# Patient Record
Sex: FEMALE | Race: WHITE | NOT HISPANIC OR LATINO | Employment: FULL TIME | URBAN - NONMETROPOLITAN AREA
[De-identification: names, ages, dates, MRNs, and addresses within clinical notes are randomized per-mention and may not be internally consistent; named-entity substitution may affect disease eponyms.]

---
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
## 2020-02-11 ENCOUNTER — APPOINTMENT (OUTPATIENT)
Dept: LAB | Facility: HOSPITAL | Age: 20
End: 2020-02-11

## 2020-02-11 ENCOUNTER — OFFICE VISIT (OUTPATIENT)
Dept: OBSTETRICS AND GYNECOLOGY | Facility: CLINIC | Age: 20
End: 2020-02-11

## 2020-02-11 VITALS
BODY MASS INDEX: 25.78 KG/M2 | HEIGHT: 64 IN | WEIGHT: 151 LBS | DIASTOLIC BLOOD PRESSURE: 68 MMHG | SYSTOLIC BLOOD PRESSURE: 104 MMHG

## 2020-02-11 DIAGNOSIS — R10.2 PELVIC PAIN: ICD-10-CM

## 2020-02-11 DIAGNOSIS — Z97.5 BREAKTHROUGH BLEEDING ON NEXPLANON: Primary | ICD-10-CM

## 2020-02-11 DIAGNOSIS — Z11.3 SCREEN FOR STD (SEXUALLY TRANSMITTED DISEASE): ICD-10-CM

## 2020-02-11 DIAGNOSIS — N92.1 BREAKTHROUGH BLEEDING ON NEXPLANON: Primary | ICD-10-CM

## 2020-02-11 DIAGNOSIS — N94.10 DYSPAREUNIA IN FEMALE: ICD-10-CM

## 2020-02-11 PROCEDURE — 87491 CHLMYD TRACH DNA AMP PROBE: CPT | Performed by: NURSE PRACTITIONER

## 2020-02-11 PROCEDURE — 99213 OFFICE O/P EST LOW 20 MIN: CPT | Performed by: NURSE PRACTITIONER

## 2020-02-11 PROCEDURE — 87661 TRICHOMONAS VAGINALIS AMPLIF: CPT | Performed by: NURSE PRACTITIONER

## 2020-02-11 PROCEDURE — 87591 N.GONORRHOEAE DNA AMP PROB: CPT | Performed by: NURSE PRACTITIONER

## 2020-02-11 RX ORDER — DESOGESTREL AND ETHINYL ESTRADIOL 21-5 (28)
1 KIT ORAL DAILY
Qty: 28 TABLET | Refills: 6 | Status: SHIPPED | OUTPATIENT
Start: 2020-02-11 | End: 2020-06-16

## 2020-02-11 NOTE — PROGRESS NOTES
Bilateral diagnostic digital mammograms:

 

Reason for examination: Nodular densities bilaterally on screening 

mammogram.

 

Comparison is made to mammographic exam dated 1/15/2020.

 

True lateral and coned compression views in CC and oblique projections 

were obtained bilaterally.

 

With these additional views, a small nodular parenchymal density persists 

at approximately the 11:30 B position of the right breast and at the 12:00

a position of the left breast. Further evaluation with ultrasound follow.

 

IMPRESSION:

 

Small nodules persist bilaterally. Ultrasound to follow.

 

BI-RADS Category 0: Incomplete. Needs additional imaging evaluation.

 

 

Bilateral breast ultrasound:

 

Ultrasound examination bilateral breast and axilla was performed.

 

In the left breast at the 12:00 position 3 cm from the nipple, there is a 

1.2 cm elongated fibrocystic lesion with no abnormal vascularity. No other

cystic or solid lesions are seen and no abnormal appearing lymph nodes are

seen in the axilla.

 

In the right breast, there is a small 6.5 mm hypoechoic fibrocystic lesion

located at the 11:30 position 5 cm from the nipple with the patient 

sitting (this corresponds to the 10:00 position 2 cm from the nipple with 

the patient supine). This would correspond with the area of mammographic 

concern. No suspicious-appearing nodules are seen. No abnormal appearing 

lymph nodes are seen in the axilla.

 

IMPRESSION:

 

Benign fibrocystic lesions bilaterally which correlate with the 

mammographic findings. Recommend 6 month follow-up with bilateral breast 

ultrasound.

 

BI-RADS Category 3:  Probably Benign.

 

"Our facility is accredited by the [employer] 

Mammography Program."

 

This patient's information has been entered into a reminder system for the

patient to be notified with the results of her examination and a target 

date for the next mammogram.

 

Electronically signed by: Elma Mireles MD (1/24/2020 1:11 PM) [place] [id number] Subjective   Tiffany Pruitt is a 20 y.o. breakthrough bleeding on nexplanon, pelvic pain    LMP: 01/27/2020  BC: Nexplanon    Pt presents with complaints of breakthrough bleeding with nexplanon.  At times bleeding is heavy and she will go through a tampon every 2 hours with passage of quarter sized clots. She has also been experiencing pelvic pain and pain with intercourse for the past two months. The pelvic pain is almost daily and is described as sharp and stabbing.  Patient worries she may have endometriosis like her mother and grandmother.      Pelvic Pain   The patient's primary symptoms include pelvic pain and vaginal bleeding. The patient's pertinent negatives include no genital itching, genital lesions, genital odor, genital rash, missed menses or vaginal discharge. This is a recurrent problem. The current episode started more than 1 month ago. The problem occurs daily. The problem has been waxing and waning. The pain is moderate. The problem affects both sides. She is not pregnant. Associated symptoms include painful intercourse. Pertinent negatives include no abdominal pain, anorexia, back pain, chills, constipation, diarrhea, discolored urine, dysuria, fever, flank pain, frequency, headaches, hematuria, joint pain, joint swelling, nausea, rash, sore throat, urgency or vomiting. The vaginal bleeding is typical of menses. She has been passing clots. She has not been passing tissue. The symptoms are aggravated by intercourse and activity. She has tried nothing for the symptoms. She is sexually active. No, her partner does not have an STD. Contraceptive use: nexplanon. Her menstrual history has been irregular.       The following portions of the patient's history were reviewed and updated as appropriate: allergies, current medications, past family history, past medical history, past social history, past surgical history and problem list.    Review of Systems   Constitutional: Negative for chills,  diaphoresis, fatigue, fever and unexpected weight change.   HENT: Negative for sore throat.    Respiratory: Negative for apnea, chest tightness and shortness of breath.    Cardiovascular: Negative for chest pain, palpitations and leg swelling.   Gastrointestinal: Negative for abdominal distention, abdominal pain, anorexia, constipation, diarrhea, nausea and vomiting.   Genitourinary: Positive for dyspareunia, pelvic pain and vaginal bleeding. Negative for decreased urine volume, difficulty urinating, dysuria, enuresis, flank pain, frequency, genital sores, hematuria, menstrual problem, missed menses, urgency, vaginal discharge and vaginal pain.   Musculoskeletal: Negative for back pain and joint pain.   Skin: Negative for rash.   Neurological: Negative for headaches.         Objective   Physical Exam   Constitutional: She is oriented to person, place, and time. Vital signs are normal. She appears well-developed and well-nourished. No distress.   Cardiovascular: Normal rate, regular rhythm and normal heart sounds.   Pulmonary/Chest: Effort normal and breath sounds normal.   Abdominal: Soft. Normal appearance and bowel sounds are normal. There is tenderness in the right lower quadrant and left lower quadrant.   Neurological: She is alert and oriented to person, place, and time.   Skin: Skin is warm and dry. She is not diaphoretic.   Psychiatric: She has a normal mood and affect. Her behavior is normal.   Nursing note and vitals reviewed.        Assessment/Plan   Tiffany was seen today for vaginal bleeding.    Diagnoses and all orders for this visit:    Breakthrough bleeding on Nexplanon  -     desogestrel-ethinyl estradiol (KARIVA) 0.15-0.02/0.01 MG (21/5) per tablet; Take 1 tablet by mouth Daily.    Pelvic pain  -     US Non-ob Transvaginal; Future    Dyspareunia in female  -     US Non-ob Transvaginal; Future    Screen for STD (sexually transmitted disease)  -     Chlamydia trachomatis, Neisseria gonorrhoeae,  Trichomonas vaginalis, PCR - Urine, Urine, Clean Catch      Will try burst of hormones with Kariva.  RBA Kariva and reviewed potential side effects.  If Kariva does not stop BTB pt wants to have nexplanon discontinued.  TVUS to r/o anatomical abnormalities.  Labs to r/o STD.  Will follow up with results and plan of care.  RTC if symptoms worsen or fail to improve.

## 2020-03-29 PROCEDURE — U0001 2019-NCOV DIAGNOSTIC P: HCPCS

## 2020-06-16 PROCEDURE — U0002 COVID-19 LAB TEST NON-CDC: HCPCS | Performed by: NURSE PRACTITIONER

## 2020-07-24 ENCOUNTER — APPOINTMENT (OUTPATIENT)
Dept: GENERAL RADIOLOGY | Facility: HOSPITAL | Age: 20
End: 2020-07-24

## 2020-07-24 ENCOUNTER — HOSPITAL ENCOUNTER (EMERGENCY)
Facility: HOSPITAL | Age: 20
Discharge: HOME | End: 2020-07-25
Attending: FAMILY MEDICINE | Admitting: FAMILY MEDICINE

## 2020-07-24 DIAGNOSIS — S93.324A LISFRANC'S DISLOCATION, RIGHT, INITIAL ENCOUNTER: Primary | ICD-10-CM

## 2020-07-24 LAB
HCG SERPL QL: NEGATIVE
HOLD SPECIMEN: NORMAL
WHOLE BLOOD HOLD SPECIMEN: NORMAL
WHOLE BLOOD HOLD SPECIMEN: NORMAL

## 2020-07-24 PROCEDURE — 25010000002 MORPHINE PER 10 MG: Performed by: FAMILY MEDICINE

## 2020-07-24 PROCEDURE — 73630 X-RAY EXAM OF FOOT: CPT

## 2020-07-24 PROCEDURE — 25010000002 ONDANSETRON PER 1 MG: Performed by: FAMILY MEDICINE

## 2020-07-24 PROCEDURE — 96375 TX/PRO/DX INJ NEW DRUG ADDON: CPT

## 2020-07-24 PROCEDURE — 73610 X-RAY EXAM OF ANKLE: CPT

## 2020-07-24 PROCEDURE — 96374 THER/PROPH/DIAG INJ IV PUSH: CPT

## 2020-07-24 PROCEDURE — 99284 EMERGENCY DEPT VISIT MOD MDM: CPT

## 2020-07-24 PROCEDURE — 84703 CHORIONIC GONADOTROPIN ASSAY: CPT

## 2020-07-24 RX ORDER — ONDANSETRON 2 MG/ML
4 INJECTION INTRAMUSCULAR; INTRAVENOUS ONCE
Status: COMPLETED | OUTPATIENT
Start: 2020-07-24 | End: 2020-07-24

## 2020-07-24 RX ORDER — BUPIVACAINE HYDROCHLORIDE 5 MG/ML
10 INJECTION, SOLUTION EPIDURAL; INTRACAUDAL ONCE
Status: COMPLETED | OUTPATIENT
Start: 2020-07-24 | End: 2020-07-25

## 2020-07-24 RX ORDER — SODIUM CHLORIDE 0.9 % (FLUSH) 0.9 %
10 SYRINGE (ML) INJECTION AS NEEDED
Status: DISCONTINUED | OUTPATIENT
Start: 2020-07-24 | End: 2020-07-25 | Stop reason: HOSPADM

## 2020-07-24 RX ORDER — LIDOCAINE HYDROCHLORIDE 10 MG/ML
10 INJECTION, SOLUTION EPIDURAL; INFILTRATION; INTRACAUDAL; PERINEURAL ONCE
Status: COMPLETED | OUTPATIENT
Start: 2020-07-24 | End: 2020-07-25

## 2020-07-24 RX ADMIN — MORPHINE SULFATE 4 MG: 4 INJECTION INTRAVENOUS at 23:11

## 2020-07-24 RX ADMIN — ONDANSETRON 4 MG: 2 INJECTION INTRAMUSCULAR; INTRAVENOUS at 23:08

## 2020-07-25 ENCOUNTER — APPOINTMENT (OUTPATIENT)
Dept: CT IMAGING | Facility: HOSPITAL | Age: 20
End: 2020-07-25

## 2020-07-25 ENCOUNTER — APPOINTMENT (OUTPATIENT)
Dept: GENERAL RADIOLOGY | Facility: HOSPITAL | Age: 20
End: 2020-07-25

## 2020-07-25 VITALS
TEMPERATURE: 98.7 F | DIASTOLIC BLOOD PRESSURE: 77 MMHG | HEIGHT: 64 IN | SYSTOLIC BLOOD PRESSURE: 121 MMHG | BODY MASS INDEX: 25.27 KG/M2 | RESPIRATION RATE: 18 BRPM | OXYGEN SATURATION: 97 % | WEIGHT: 148 LBS | HEART RATE: 74 BPM

## 2020-07-25 PROCEDURE — 63710000001 ONDANSETRON ODT 4 MG TABLET DISPERSIBLE: Performed by: FAMILY MEDICINE

## 2020-07-25 PROCEDURE — 99283 EMERGENCY DEPT VISIT LOW MDM: CPT | Performed by: ORTHOPAEDIC SURGERY

## 2020-07-25 PROCEDURE — 28600 TREAT FOOT DISLOCATION: CPT | Performed by: ORTHOPAEDIC SURGERY

## 2020-07-25 PROCEDURE — 73700 CT LOWER EXTREMITY W/O DYE: CPT

## 2020-07-25 PROCEDURE — 73620 X-RAY EXAM OF FOOT: CPT

## 2020-07-25 RX ORDER — SODIUM CHLORIDE 9 MG/ML
INJECTION, SOLUTION INTRAVENOUS
Status: DISCONTINUED
Start: 2020-07-25 | End: 2020-07-25 | Stop reason: HOSPADM

## 2020-07-25 RX ORDER — KETAMINE HYDROCHLORIDE 100 MG/ML
1 INJECTION INTRAMUSCULAR; INTRAVENOUS ONCE
Status: COMPLETED | OUTPATIENT
Start: 2020-07-25 | End: 2020-07-25

## 2020-07-25 RX ORDER — OXYCODONE HYDROCHLORIDE AND ACETAMINOPHEN 5; 325 MG/1; MG/1
1 TABLET ORAL ONCE
Status: COMPLETED | OUTPATIENT
Start: 2020-07-25 | End: 2020-07-25

## 2020-07-25 RX ORDER — ONDANSETRON 4 MG/1
4 TABLET, ORALLY DISINTEGRATING ORAL ONCE
Status: COMPLETED | OUTPATIENT
Start: 2020-07-25 | End: 2020-07-25

## 2020-07-25 RX ADMIN — OXYCODONE HYDROCHLORIDE AND ACETAMINOPHEN 1 TABLET: 5; 325 TABLET ORAL at 03:00

## 2020-07-25 RX ADMIN — ONDANSETRON 4 MG: 4 TABLET, ORALLY DISINTEGRATING ORAL at 03:00

## 2020-07-25 RX ADMIN — BUPIVACAINE HYDROCHLORIDE 10 ML: 5 INJECTION, SOLUTION EPIDURAL; INTRACAUDAL; PERINEURAL at 00:00

## 2020-07-25 RX ADMIN — LIDOCAINE HYDROCHLORIDE 10 ML: 10 INJECTION, SOLUTION EPIDURAL; INFILTRATION; INTRACAUDAL; PERINEURAL at 00:00

## 2020-07-25 RX ADMIN — KETAMINE HYDROCHLORIDE 67 MG: 100 INJECTION INTRAMUSCULAR; INTRAVENOUS at 00:52

## 2020-07-25 NOTE — ED TRIAGE NOTES
Approximately 20 mins prior to arrival: Pt states she stepped off porch step wrong causing shooting pain up right foot causing her to fall.

## 2020-07-25 NOTE — ED PROVIDER NOTES
Subjective     History provided by:  Patient   used: No    Patient is a 20 years old female with no signal past medical history who presented here today because of foot injury.  She said that about 20 minutes ago she stepped off porch step wrong way and that caused shooting pain up her right foot causing her to fall.  Also, having some swelling and pain.  Denies any numbness or tingling.    Review of Systems   Musculoskeletal: Positive for joint swelling.   All other systems reviewed and are negative.      Past Medical History:   Diagnosis Date   • Allergic rhinitis    • Asthma    • Bronchitis    • Candidiasis of urogenital site    • Encounter for screening for infections with a predominantly sexual mode of transmission    • Fever    • Hemangioma     Hemangioma   • Menorrhagia    • Miscarriage    • Oral contraceptive prescribed    • Other sex counseling    • Otitis media    • Upper respiratory infection    • Urticaria        Allergies   Allergen Reactions   • Amoxicillin Hives   • Ceftin [Cefuroxime] Hives   • Ciprocinonide [Fluocinolone] Hives   • Penicillins Hives   • Sulfa Antibiotics Other (See Comments)     Pt does not know the reaction to this med       Past Surgical History:   Procedure Laterality Date   • INJECTION OF MEDICATION  07/21/2015    Depo Provera (medroxyprogesterone acetate) (2)     • SKIN BIOPSY  2000    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 [facility]       Family History   Problem Relation Age of Onset   • Asthma Other    • Bipolar disorder Other    • Endometriosis Other    • Schizophrenia Other        Social History     Socioeconomic History   • Marital status: Single     Spouse name: Not on file   • Number of children: Not on file   • Years of education: Not on file   • Highest education level: Not on file   Tobacco Use   • Smoking  "status: Never Smoker   • Smokeless tobacco: Never Used   Substance and Sexual Activity   • Alcohol use: Yes   • Drug use: No   • Sexual activity: Yes     Partners: Male     Birth control/protection: Injection       /77   Pulse 74   Temp 98.7 °F (37.1 °C) (Temporal)   Resp 18   Ht 162.6 cm (64\")   Wt 67.1 kg (148 lb)   LMP 07/15/2020   SpO2 97%   BMI 25.40 kg/m²     Objective   Physical Exam   Constitutional: She is oriented to person, place, and time. She appears well-developed and well-nourished.   HENT:   Head: Normocephalic and atraumatic.   Right Ear: External ear normal.   Left Ear: External ear normal.   Nose: Nose normal.   Mouth/Throat: Oropharynx is clear and moist.   Neck: Normal range of motion. Neck supple.   Cardiovascular: Normal rate, regular rhythm, normal heart sounds and intact distal pulses.   Pulmonary/Chest: Effort normal and breath sounds normal.   Abdominal: Soft. Bowel sounds are normal.   Musculoskeletal: She exhibits edema, tenderness and deformity.   Neurological: She is alert and oriented to person, place, and time.   Skin: Skin is warm. Capillary refill takes less than 2 seconds.   Nursing note and vitals reviewed.      Procedural Sedation  Date/Time: 7/25/2020 1:12 AM  Performed by: Gibson Martinez MD  Authorized by: Gibson Martinez MD     Consent:     Consent obtained:  Written    Consent given by:  Patient    Risks discussed:  Allergic reaction, dysrhythmia, inadequate sedation, nausea and vomiting  Indications:     Procedure performed:  Dislocation reduction    Procedure necessitating sedation performed by:  Different physician    Intended level of sedation:  Moderate (conscious sedation)  Pre-sedation assessment:     NPO status caution: urgency dictates proceeding with non-ideal NPO status      ASA classification: class 1 - normal, healthy patient      Neck mobility: normal      Mouth opening:  3 or more finger widths    Mallampati score:  I - soft " palate, uvula, fauces, pillars visible    Pre-sedation assessments completed and reviewed: airway patency      History of difficult intubation: no      Pre-sedation assessment completed:  7/25/2020 12:40 AM  Immediate pre-procedure details:     Reassessment: Patient reassessed immediately prior to procedure      Reviewed: vital signs and relevant labs/tests      Verified: bag valve mask available, emergency equipment available, intubation equipment available, IV patency confirmed, oxygen available and reversal medications available    Procedure details (see MAR for exact dosages):     Sedation start time:  7/25/2020 12:54 AM    Preoxygenation:  Nasal cannula    Sedation:  Ketamine    Intra-procedure monitoring:  Blood pressure monitoring and cardiac monitor    Intra-procedure events: none      Sedation end time:  7/25/2020 1:10 AM  Post-procedure details:     Post-sedation assessment completed:  7/25/2020 1:17 AM    Attendance: Constant attendance by certified staff until patient recovered      Recovery: Patient returned to pre-procedure baseline      Estimated blood loss (see I/O flowsheets): no      Complications:  None    Post-sedation assessments completed and reviewed: airway patency      Specimens recovered:  None    Patient is stable for discharge or admission: yes      Patient tolerance:  Tolerated well, no immediate complications               ED Course  ED Course as of Jul 25 0228   Sat Jul 25, 2020   0118 Dr. Monique came and reduced the fracture and dislocation.  He want patient to follow-up with him in his office on Wednesday.  Elevation and rest encouraged.  Take the medication as directed.  Come back to the ER symptoms get worse.    [MO]      ED Course User Index  [MO] Gibson Martinez MD            Labs Reviewed   HCG, SERUM, QUALITATIVE - Normal   RAINBOW DRAW    Narrative:     The following orders were created for panel order [place] Draw.  Procedure                               Abnormality          Status                     ---------                               -----------         ------                     Light Blue Top[[id number]]                                   Final result               Green Top (Gel)[[id number]]                                  Final result               Lavender Top[[id number]]                                     Final result               Gold Top - SST[[id number]]                                                                Please view results for these tests on the individual orders.   LIGHT BLUE TOP   GREEN TOP   LAVENDER TOP   GOLD TOP - SST       CT Lower Extremity Right Without Contrast   Final Result   Lisfranc fracture dislocation as above.      Electronically signed by:  Earnest Jernigan MD  7/25/2020 2:03 AM CDT   Workstation: [id number]      XR Foot 2 View Right   Final Result      Interval partial closed reduction of the previously seen acute   Lisfranc fracture-dislocation.      Electronically signed by:  Emiliana Freeman MD  7/25/2020 1:17 AM CDT   Workstation: [id number]      XR Ankle 3+ View Right   Final Result   1. No acute osseous finding..            FINDINGS RIGHT FOOT: 3 views of the right foot were obtained.   There is a Lisfranc type fracture dislocation along the   tarsal-metatarsal joint. There are small avulsed fragments along   the lateral aspect of the cuboid which are minimally displaced.   On the lateral view, there are some small fracture fragments   dorsal to the cuneiforms favored to originate from the base of   the second metatarsal. The second through fourth metatarsal bases   are subluxed dorsally and the third through fifth metatarsal   bases are dislocated laterally. There is generalized soft tissue   swelling. CT may be helpful to better assess the articular   relationships as well as evaluate for additional, occult   fractures which are suspected.            IMPRESSION: Lisfranc type fracture dislocation about the   tarsal-metatarsal joint as  discussed      Electronically signed by:  Alejandro Freed MD  7/24/2020 10:59 PM   CDT Workstation: [id number]      XR Foot 3+ View Right   Final Result   1. No acute osseous finding..            FINDINGS RIGHT FOOT: 3 views of the right foot were obtained.   There is a Lisfranc type fracture dislocation along the   tarsal-metatarsal joint. There are small avulsed fragments along   the lateral aspect of the cuboid which are minimally displaced.   On the lateral view, there are some small fracture fragments   dorsal to the cuneiforms favored to originate from the base of   the second metatarsal. The second through fourth metatarsal bases   are subluxed dorsally and the third through fifth metatarsal   bases are dislocated laterally. There is generalized soft tissue   swelling. CT may be helpful to better assess the articular   relationships as well as evaluate for additional, occult   fractures which are suspected.            IMPRESSION: Lisfranc type fracture dislocation about the   tarsal-metatarsal joint as discussed      Electronically signed by:  Alejandro Freed MD  7/24/2020 10:59 PM   CDT Workstation: [id number]                                        [facility]    Final diagnoses:   Lisfranc's dislocation, right, initial encounter            Gibson Martinez MD  07/25/20 0228

## 2020-07-25 NOTE — CONSULTS
Tiffany Pruitt is a 20 y.o. female   Primary provider:  Provider, No Known       Chief Complaint   Patient presents with   • Foot Injury       HISTORY OF PRESENT ILLNESS: Right foot pain.    History of Present Illness Ms. Pruitt is 20 years old.  She was descending some stairs when she slipped and had a twisting injury to her right foot.  This was an isolated injury.  She presented to the emergency room where x-rays showed a Lisfranc injury to the right foot.  Dr. De Souza has asked that I see her for evaluation and treatment.    She is taken no medications on a regular basis and is allergic to penicillin and sulfa drugs amoxicillin and Cipro..  She does not smoke.  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  She is single and lives in [place].  She says her general health is good.     CONCURRENT MEDICAL HISTORY:    Past Medical History:   Diagnosis Date   • Allergic rhinitis    • Asthma    • Bronchitis    • Candidiasis of urogenital site    • Encounter for screening for infections with a predominantly sexual mode of transmission    • Fever    • Hemangioma     Hemangioma   • Menorrhagia    • Miscarriage    • Oral contraceptive prescribed    • Other sex counseling    • Otitis media    • Upper respiratory infection    • Urticaria        Allergies   Allergen Reactions   • Amoxicillin Hives   • Ceftin [Cefuroxime] Hives   • Ciprocinonide [Fluocinolone] Hives   • Penicillins Hives   • Sulfa Antibiotics Other (See Comments)     Pt does not know the reaction to this med         Current Facility-Administered Medications:   •  sodium chloride 0.9 % infusion  - ADS Override Pull, , , ,   •  sodium chloride 0.9 % flush 10 mL, 10 mL, Intravenous, PRN, Ozor, Gibson Cleaning MD    Current Outpatient Medications:   •  azithromycin (ZITHROMAX) 250 MG tablet, Take 2 tablets the first day, then 1 tablet daily for 4 days., Disp: 6 tablet, Rfl: 0  •  Etonogestrel (NEXPLANON) 68 MG implant subdermal implant,  "Inject 1 each into the appropriate area of the skin as directed by provider 1 (One) Time., Disp: , Rfl:     Past Surgical History:   Procedure Laterality Date   • INJECTION OF MEDICATION  07/21/2015    Depo Provera (medroxyprogesterone acetate) (2)     • SKIN BIOPSY  2000    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 [facility]       Family History   Problem Relation Age of Onset   • Asthma Other    • Bipolar disorder Other    • Endometriosis Other    • Schizophrenia Other        Social History     Socioeconomic History   • Marital status: Single     Spouse name: Not on file   • Number of children: Not on file   • Years of education: Not on file   • Highest education level: Not on file   Tobacco Use   • Smoking status: Never Smoker   • Smokeless tobacco: Never Used   Substance and Sexual Activity   • Alcohol use: Yes   • Drug use: No   • Sexual activity: Yes     Partners: Male     Birth control/protection: Injection        Review of Systems  Is remarkable for foot pain as noted above.  There is been no numbness or tingling in the foot.  PHYSICAL EXAMINATION:       Vitals:    07/24/20 2144 07/24/20 2314 07/25/20 0051 07/25/20 0100   BP: (!) 152/102 158/94 147/87 (!) 160/105   BP Location: Left arm Right arm Right arm    Patient Position: Sitting Lying Lying    Pulse: 116 88 99 (!) 128   Resp: 20 18 18    Temp: 98.7 °F (37.1 °C)      TempSrc: Temporal      SpO2: 98% 98% 100% 100%   Weight: 67.1 kg (148 lb)      Height: 162.6 cm (64\")          Physical Exam she is alert and in remarkably little pain.  She responds appropriately to questions and commands.    GAIT:     []  Normal  []  Antalgic    Assistive device: []  None  []  Walker     []  Crutches  []  Cane     []  Wheelchair  []  Stretcher    Ortho Exam is directed to the right lower extremity.  There is mild to moderate swelling of " the dorsum of the foot.  Skin is unbroken.  There is early ecchymosis over the dorsum of the midfoot.  There is no blistering of the skin.  Posterior tibial pulses strong.  Sensory exam is intact to soft touch.  There is a tattoo over the posterior medial aspect of the ankle.    Radiographs of the foot show what appears to be a divergent Lisfranc injury involving all rays of the foot.  The lateral metatarsals are displaced dorsally.  The first ray is also displaced somewhat laterally.    Treatment options were reviewed.  I recommended close manipulation.  She was agreeable with this.    An ankle block was performed using a 50-50 mixture of Marcaine and 1% Xylocaine.  Patient was sedated with ketamine as supervised by .      The tarsometatarsal joints were then manipulated with traction and dorsally applied pressure over the lateral metatarsals.  A clinical reduction was achieved.      Radiographs of the foot were repeated and these confirmed reduction of the dislocations.      A bulky gauze dressing was placed on the foot followed by a short leg posterior fiberglass splint.  She tolerated the procedure well.          Xr Ankle 3+ View Right    Result Date: 7/24/2020  Narrative: THREE-VIEW RIGHT ANKLE, THREE-VIEW RIGHT FOOT CLINICAL HISTORY: fall, pain FINDINGS RIGHT ANKLE: 3 views of the right ankle were obtained. No acute fracture or dislocation identified. Soft tissues appear unremarkable..     Impression: 1. No acute osseous finding.. FINDINGS RIGHT FOOT: 3 views of the right foot were obtained. There is a Lisfranc type fracture dislocation along the tarsal-metatarsal joint. There are small avulsed fragments along the lateral aspect of the cuboid which are minimally displaced. On the lateral view, there are some small fracture fragments dorsal to the cuneiforms favored to originate from the base of the second metatarsal. The second through fourth metatarsal bases are subluxed dorsally and the third through  fifth metatarsal bases are dislocated laterally. There is generalized soft tissue swelling. CT may be helpful to better assess the articular relationships as well as evaluate for additional, occult fractures which are suspected. IMPRESSION: Lisfranc type fracture dislocation about the tarsal-metatarsal joint as discussed Electronically signed by:  Alejandro Freed MD  7/24/2020 10:59 PM CDT Workstation: [id number]    Xr Foot 3+ View Right    Result Date: 7/24/2020  Narrative: THREE-VIEW RIGHT ANKLE, THREE-VIEW RIGHT FOOT CLINICAL HISTORY: fall, pain FINDINGS RIGHT ANKLE: 3 views of the right ankle were obtained. No acute fracture or dislocation identified. Soft tissues appear unremarkable..     Impression: 1. No acute osseous finding.. FINDINGS RIGHT FOOT: 3 views of the right foot were obtained. There is a Lisfranc type fracture dislocation along the tarsal-metatarsal joint. There are small avulsed fragments along the lateral aspect of the cuboid which are minimally displaced. On the lateral view, there are some small fracture fragments dorsal to the cuneiforms favored to originate from the base of the second metatarsal. The second through fourth metatarsal bases are subluxed dorsally and the third through fifth metatarsal bases are dislocated laterally. There is generalized soft tissue swelling. CT may be helpful to better assess the articular relationships as well as evaluate for additional, occult fractures which are suspected. IMPRESSION: Lisfranc type fracture dislocation about the tarsal-metatarsal joint as discussed Electronically signed by:  Alejandro Freed MD  7/24/2020 10:59 PM CDT Workstation: [id number]          ASSESSMENT: Lisfranc dislocation right foot.  The severity of the injury was stressed to her.  I think she will require internal fixation of her Lisfranc joint.  However this should be done as a delayed procedure.  CT scan will be performed of the foot prior to discharge today.  She was given a  prescription for 30 hydrocodone 7.5 mg each to take as needed for pain.  She was instructed in strict elevation.  She will call for pain not controlled with the above medication.  She was also given crutches.  Return to my office in 5 to 6 days for further counseling.          PLAN      Jon Mckeon MD

## 2020-07-28 ENCOUNTER — PREP FOR SURGERY (OUTPATIENT)
Dept: OTHER | Facility: HOSPITAL | Age: 20
End: 2020-07-28

## 2020-07-28 DIAGNOSIS — S93.324A LISFRANC'S DISLOCATION, RIGHT, INITIAL ENCOUNTER: Primary | ICD-10-CM

## 2020-07-28 PROBLEM — S93.324D LISFRANC DISLOCATION, RIGHT, SUBSEQUENT ENCOUNTER: Status: ACTIVE | Noted: 2020-07-28

## 2020-07-28 RX ORDER — CLINDAMYCIN PHOSPHATE 600 MG/50ML
600 INJECTION INTRAVENOUS ONCE
Status: CANCELLED | OUTPATIENT
Start: 2020-08-03 | End: 2020-07-28

## 2020-07-29 ENCOUNTER — OFFICE VISIT (OUTPATIENT)
Dept: ORTHOPEDIC SURGERY | Facility: CLINIC | Age: 20
End: 2020-07-29

## 2020-07-29 VITALS
DIASTOLIC BLOOD PRESSURE: 89 MMHG | OXYGEN SATURATION: 98 % | HEART RATE: 82 BPM | TEMPERATURE: 98.5 F | SYSTOLIC BLOOD PRESSURE: 111 MMHG | HEIGHT: 64 IN | WEIGHT: 148 LBS | BODY MASS INDEX: 25.27 KG/M2

## 2020-07-29 DIAGNOSIS — M79.671 RIGHT FOOT PAIN: Primary | ICD-10-CM

## 2020-07-29 DIAGNOSIS — S93.324D CLOSED DISLOCATION OF TARSOMETATARSAL JOINT OF RIGHT FOOT, SUBSEQUENT ENCOUNTER: ICD-10-CM

## 2020-07-29 PROBLEM — S93.324A LISFRANC'S DISLOCATION, RIGHT, INITIAL ENCOUNTER: Status: ACTIVE | Noted: 2020-07-29

## 2020-07-29 PROCEDURE — 99024 POSTOP FOLLOW-UP VISIT: CPT | Performed by: ORTHOPAEDIC SURGERY

## 2020-07-29 PROCEDURE — 29515 APPLICATION SHORT LEG SPLINT: CPT | Performed by: ORTHOPAEDIC SURGERY

## 2020-07-29 RX ORDER — ONDANSETRON 4 MG/1
4 TABLET, FILM COATED ORAL EVERY 8 HOURS PRN
COMMUNITY
End: 2021-01-26

## 2020-07-29 RX ORDER — HYDROCODONE BITARTRATE AND ACETAMINOPHEN 7.5; 325 MG/1; MG/1
1 TABLET ORAL EVERY 6 HOURS PRN
Status: ON HOLD | COMMUNITY
End: 2020-08-03

## 2020-07-29 NOTE — PROGRESS NOTES
Tiffany Pruitt is a 20 y.o. female   Primary provider:  Provider, No Known       Chief Complaint   Patient presents with   • Right Foot - Initial Evaluation, Pain       HISTORY OF PRESENT ILLNESS: Patient is being seen for her right foot. Pain level of 0/10.    This is the first office follow-up for a Lisfranc injury to the right foot.    Ms. Pruitt is 20 years old.  She sustained a Lisfranc fracture dislocation of the right foot 24 July.  She underwent manipulation and splinting in the emergency room.  Her pain is been fairly well controlled.  She denies any numbness or tingling.    Home medications include hydrocodone and she is allergic to penicillin amoxicillin Ceftin Cipro and sulfa drugs.  She does not smoke.     Past medical history shows her general health is been good.  Previous surgeries include removal of wisdom teeth and removal of what sounds like an angioma from the forehead as an infant.  There is been no history of anesthetic complication.  She denies any other ongoing medical problems.    Family history she is single.    History she lives in [place] with her boyfriend.  She is employed as a nursing assistant I believe.    Review of systems remarkable for right foot pain.    Pain   This is a new problem. Episode onset: 7/24/2020. The problem occurs constantly. Associated symptoms comments: Aching . The symptoms are aggravated by standing. She has tried acetaminophen and rest for the symptoms.        CONCURRENT MEDICAL HISTORY:    Past Medical History:   Diagnosis Date   • Allergic rhinitis    • Asthma    • Bronchitis    • Candidiasis of urogenital site    • Encounter for screening for infections with a predominantly sexual mode of transmission    • Fever    • Hemangioma     Hemangioma   • Menorrhagia    • Miscarriage    • Oral contraceptive prescribed    • Other sex counseling    • Otitis media    • Upper respiratory infection    • Urticaria        Allergies   Allergen Reactions   •  Amoxicillin Hives   • Ceftin [Cefuroxime] Hives   • Ciprocinonide [Fluocinolone] Hives   • Penicillins Hives   • Sulfa Antibiotics Other (See Comments)     Pt does not know the reaction to this med         Current Outpatient Medications:   •  azithromycin (ZITHROMAX) 250 MG tablet, Take 2 tablets the first day, then 1 tablet daily for 4 days., Disp: 6 tablet, Rfl: 0  •  HYDROcodone-acetaminophen (NORCO) 7.5-325 MG per tablet, Take 1 tablet by mouth Every 6 (Six) Hours As Needed for Moderate Pain ., Disp: , Rfl:   •  ondansetron (ZOFRAN) 4 MG tablet, Take 4 mg by mouth Every 8 (Eight) Hours As Needed for Nausea or Vomiting., Disp: , Rfl:     Past Surgical History:   Procedure Laterality Date   • INJECTION OF MEDICATION  07/21/2015    Depo Provera (medroxyprogesterone acetate) (2)     • SKIN BIOPSY  2000    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 [facility]       Family History   Problem Relation Age of Onset   • Asthma Other    • Bipolar disorder Other    • Endometriosis Other    • Schizophrenia Other    • Cancer Other    • Lung disease Other    • Diabetes Other        Social History     Socioeconomic History   • Marital status: Single     Spouse name: Not on file   • Number of children: Not on file   • Years of education: Not on file   • Highest education level: Not on file   Tobacco Use   • Smoking status: Never Smoker   • Smokeless tobacco: Never Used   Substance and Sexual Activity   • Alcohol use: Yes   • Drug use: No   • Sexual activity: Yes     Partners: Male     Birth control/protection: Injection        Review of Systems   Constitutional: Negative.    HENT: Negative.    Eyes: Positive for visual disturbance.   Respiratory: Negative.    Cardiovascular: Negative.    Gastrointestinal: Negative.    Endocrine: Negative.    Genitourinary: Negative.    Musculoskeletal: Negative.   "  Skin: Negative.    Allergic/Immunologic: Negative.    Neurological: Positive for dizziness.   Hematological: Negative.    Psychiatric/Behavioral: Negative.      Review of systems is positive as noted above.  PHYSICAL EXAMINATION:       Vitals:    07/29/20 1515   BP: 111/89   BP Location: Right arm   Patient Position: Sitting   Cuff Size: Adult   Pulse: 82   Temp: 98.5 °F (36.9 °C)   TempSrc: Temporal   SpO2: 98%   Weight: 67.1 kg (148 lb)   Height: 162.6 cm (64\")   PainSc: 0-No pain       Physical Exam in general she is healthy-appearing alert and in no apparent distress.  She responds appropriately to questions and commands.    HEENT exam showed extraocular movements are intact.  Oropharynx shows teeth are in good repair.    Lungs are clear to auscultation.    Cardiac exam shows a regular rhythm normal rate no murmur.    The abdomen is soft and nontender with active bowel sounds.  Genitourinary and rectal exams were not done.    Exam of the extremities is directed to the right lower extremity.  There is a short leg posterior splint in place.  The splint was removed.  There was moderate swelling and ecchymosis in the dorsum of the foot.  Skin was otherwise unremarkable.  Sensory exam was intact to soft touch.  The toes are warm.  Passive flexion and extension of the toes produces the expected amount of pain she was felt to be appropriate for her injury.    GAIT:     []  Normal  []  Antalgic    Assistive device: []  None  []  Walker     []  Crutches  []  Cane     []  Wheelchair  []  Stretcher    Ortho Exam          Xr Ankle 3+ View Right    Result Date: 7/24/2020  Narrative: THREE-VIEW RIGHT ANKLE, THREE-VIEW RIGHT FOOT CLINICAL HISTORY: fall, pain FINDINGS RIGHT ANKLE: 3 views of the right ankle were obtained. No acute fracture or dislocation identified. Soft tissues appear unremarkable..     Impression: 1. No acute osseous finding.. FINDINGS RIGHT FOOT: 3 views of the right foot were obtained. There is a Lisfranc " type fracture dislocation along the tarsal-metatarsal joint. There are small avulsed fragments along the lateral aspect of the cuboid which are minimally displaced. On the lateral view, there are some small fracture fragments dorsal to the cuneiforms favored to originate from the base of the second metatarsal. The second through fourth metatarsal bases are subluxed dorsally and the third through fifth metatarsal bases are dislocated laterally. There is generalized soft tissue swelling. CT may be helpful to better assess the articular relationships as well as evaluate for additional, occult fractures which are suspected. IMPRESSION: Lisfranc type fracture dislocation about the tarsal-metatarsal joint as discussed Electronically signed by:  Alejandro Freed MD  7/24/2020 10:59 PM CDT Workstation: [id number]    Xr Foot 2 View Right    Result Date: 7/25/2020  Narrative: EXAM DESCRIPTION: XR FOOT 2 VW RIGHT CLINICAL HISTORY: 20 years Female S/P reduction TECHNIQUE: Two views of the right foot. COMPARISON: 7/24/2020. FINDINGS: There has been partial closed reduction of the previously seen acute Lisfranc fracture-dislocation. There remains mild widening of the Lisfranc joint and slight lateral subluxation of the second through fifth metatarsals relative to the tarsal bones. Reduction interval reduction of the previously seen dorsal displacement. There is an acute osseous avulsion from the medial base of the second metatarsal. There are suspected small fracture fragments likely arising from the cuboid, lateral cuneiform, and middle and/or medial cuneiforms. No aggressive osseous lesion. No no radiodense foreign body.     Impression: Interval partial closed reduction of the previously seen acute Lisfranc fracture-dislocation. Electronically signed by:  Emiliana Freeman MD  7/25/2020 1:17 AM CDT Workstation: [id number]    Xr Foot 3+ View Right    Result Date: 7/24/2020  Narrative: THREE-VIEW RIGHT ANKLE, THREE-VIEW RIGHT FOOT CLINICAL  HISTORY: fall, pain FINDINGS RIGHT ANKLE: 3 views of the right ankle were obtained. No acute fracture or dislocation identified. Soft tissues appear unremarkable..     Impression: 1. No acute osseous finding.. FINDINGS RIGHT FOOT: 3 views of the right foot were obtained. There is a Lisfranc type fracture dislocation along the tarsal-metatarsal joint. There are small avulsed fragments along the lateral aspect of the cuboid which are minimally displaced. On the lateral view, there are some small fracture fragments dorsal to the cuneiforms favored to originate from the base of the second metatarsal. The second through fourth metatarsal bases are subluxed dorsally and the third through fifth metatarsal bases are dislocated laterally. There is generalized soft tissue swelling. CT may be helpful to better assess the articular relationships as well as evaluate for additional, occult fractures which are suspected. IMPRESSION: Lisfranc type fracture dislocation about the tarsal-metatarsal joint as discussed Electronically signed by:  Alejandro Freed MD  7/24/2020 10:59 PM CDT Workstation: [id number]    Ct Lower Extremity Right Without Contrast    Result Date: 7/25/2020  Narrative: HISTORY:  foot fracture, S93.324A Dislocation of tarsometatarsal joint of right foot, initial encounter PROCEDURE: CT LOWER EXTREMITY WITHOUT IV CONTRAST TECHNIQUE: Noncontrast axial, coronal, and sagittal images of the right foot were obtained.  This exam was performed according to our departmental dose-optimization program, which includes automated exposure control, adjustment of the mA and/or kV according to patient size and/or use of iterative reconstruction technique. COMPARISON:  Plain films from earlier today INTERPRETATION: Examination is trace mildly displaced fractures of the second and third metatarsal bases, intermediate and lateral cuneiforms, and cuboid.  Mild lateral subluxation of the second through fifth metatarsals is also noted.   There is normal alignment of the first TMT joint.  There is generalized soft tissue swelling.  Posterior splint is in place.     Impression: Lisfranc fracture dislocation as above. Electronically signed by:  Earnest Jernigan MD  7/25/2020 2:03 AM CDT Workstation: [id number]          ASSESSMENT: Lisfranc fracture dislocation right foot.    We had a prolonged discussion concerning the natural history of the disorder and treatment options.  She understands it is likely she will have chronic problems with the foot from now on.  I think her best chance for good result would be with accurate reduction and stabilization of the tarsometatarsal joints.  Technique of surgery was discussed and x-rays were reviewed with her.  Anticipated benefits of surgery as well as potential complications of surgery and anesthetic were reviewed in detail and she appeared to understand all matters discussed and wished to proceed.    The foot was dressed with an Ace wrap.  A new short leg posterior splint was then fabricated of fiberglass casting material.  She may remove the splint for bathing and frequent rewrapping of the foot.  She was instructed in strict elevation to help mobilize her edema.    Surgery has tentatively been scheduled for 3 August.    Diagnoses and all orders for this visit:    Right foot pain    Closed dislocation of tarsometatarsal joint of right foot, subsequent encounter    Other orders  -     HYDROcodone-acetaminophen (NORCO) 7.5-325 MG per tablet; Take 1 tablet by mouth Every 6 (Six) Hours As Needed for Moderate Pain .  -     ondansetron (ZOFRAN) 4 MG tablet; Take 4 mg by mouth Every 8 (Eight) Hours As Needed for Nausea or Vomiting.          PLAN  Body mass index is 25.4 kg/m².    No follow-ups on file.    Jon Mckeon MD

## 2020-07-30 ENCOUNTER — PREP FOR SURGERY (OUTPATIENT)
Dept: OTHER | Facility: HOSPITAL | Age: 20
End: 2020-07-30

## 2020-07-30 NOTE — H&P
Tiffany Pruitt is a 20 y.o. female   Primary provider:  Provider, No Known       No chief complaint on file.      HISTORY OF PRESENT ILLNESS:         Ms. Pruitt is 20 years old.  She sustained a Lisfranc fracture dislocation of the right foot 24 July.  She underwent manipulation and splinting in the emergency room.  Her pain is been fairly well controlled.  She denies any numbness or tingling.  Imaging including CT scan done after manipulation showed continued displacement of the tarsometatarsal joints.  Treatment options reviewed and I recommended open reduction and internal fixation of the Lisfranc joint.    Home medications include hydrocodone . she is allergic to penicillin amoxicillin Ceftin Cipro and sulfa drugs.  She does not smoke.     Past medical history shows her general health is been good.  Previous surgeries include removal of wisdom teeth and removal of what sounds like an angioma from the forehead as an infant.  There is been no history of anesthetic complication.  She denies any other ongoing medical problems.    Family history she is single.    History she lives in [place] with her boyfriend.  She is employed as a nursing assistant I believe.    Review of systems remarkable for right foot pain.    Pain   This is a new problem. Episode onset: 7/24/2020. The problem occurs constantly. Associated symptoms comments: Aching . The symptoms are aggravated by standing. She has tried acetaminophen and rest for the symptoms.        CONCURRENT MEDICAL HISTORY:    Past Medical History:   Diagnosis Date   • Allergic rhinitis    • Asthma    • Bronchitis    • Candidiasis of urogenital site    • Encounter for screening for infections with a predominantly sexual mode of transmission    • Fever    • Hemangioma     Hemangioma   • Menorrhagia    • Miscarriage    • Oral contraceptive prescribed    • Other sex counseling    • Otitis media    • Upper respiratory infection    • Urticaria        Allergies    Allergen Reactions   • Amoxicillin Hives   • Ceftin [Cefuroxime] Hives   • Ciprocinonide [Fluocinolone] Hives   • Penicillins Hives   • Sulfa Antibiotics Other (See Comments)     Pt does not know the reaction to this med         Current Outpatient Medications:   •  azithromycin (ZITHROMAX) 250 MG tablet, Take 2 tablets the first day, then 1 tablet daily for 4 days. (Patient taking differently: Take 250 mg by mouth Daily. 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 [facility]       Family History   Problem Relation Age of Onset   • Asthma Other    • Bipolar disorder Other    • Endometriosis Other    • Schizophrenia Other    • Cancer Other    • Lung disease Other    • Diabetes Other        Social History     Socioeconomic History   • Marital status: Single     Spouse name: Not on file   • Number of children: Not on file   • Years of education: Not on file   • Highest education level: Not on file   Tobacco Use   • Smoking status: Never Smoker   • Smokeless tobacco: Never Used   Substance and Sexual Activity   • Alcohol use: Yes   • Drug use: No   • Sexual activity: Yes     Partners: Male     Birth control/protection: None        Review of Systems   Constitutional: Negative.    HENT: Negative.    Eyes: Positive for visual disturbance.    Respiratory: Negative.    Cardiovascular: Negative.    Gastrointestinal: Negative.    Endocrine: Negative.    Genitourinary: Negative.    Musculoskeletal: Negative.    Skin: Negative.    Allergic/Immunologic: Negative.    Neurological: Positive for dizziness.   Hematological: Negative.    Psychiatric/Behavioral: Negative.      Review of systems is positive as noted above.  PHYSICAL EXAMINATION:       There were no vitals filed for this visit.    Physical Exam in general she is healthy-appearing alert and in no apparent distress.  She responds appropriately to questions and commands.    HEENT exam showed extraocular movements are intact.  Oropharynx shows teeth are in good repair.    Lungs are clear to auscultation.    Cardiac exam shows a regular rhythm normal rate no murmur.    The abdomen is soft and nontender with active bowel sounds.  Genitourinary and rectal exams were not done.    Exam of the extremities is directed to the right lower extremity.  There is a short leg posterior splint in place.  The splint was removed.  There was moderate swelling and ecchymosis in the dorsum of the foot.  Skin was otherwise unremarkable.  Sensory exam was intact to soft touch.  The toes are warm.  Passive flexion and extension of the toes produces the expected amount of pain she was felt to be appropriate for her injury.    Injury films show displacement of all to tarsometatarsal joints with lateral and dorsal displacement of the lesser metatarsals.  X-rays done after manipulation showed improved alignment of the fracture still with lateral displacement of the second metatarsal base.    CT scan of the foot done after manipulation showed marginal fractures of the lateral cuneiform and cuboid as well as a comminuted fracture of the base of the third metatarsal.  There was slight abduction of the first ray.      GAIT:     []  Normal  []  Antalgic    Assistive device: []  None  []  Walker     []  Crutches  []  Cane     []   Wheelchair  []  Stretcher    Ortho Exam          Xr Ankle 3+ View Right    Result Date: 7/24/2020  Narrative: THREE-VIEW RIGHT ANKLE, THREE-VIEW RIGHT FOOT CLINICAL HISTORY: fall, pain FINDINGS RIGHT ANKLE: 3 views of the right ankle were obtained. No acute fracture or dislocation identified. Soft tissues appear unremarkable..     Impression: 1. No acute osseous finding.. FINDINGS RIGHT FOOT: 3 views of the right foot were obtained. There is a Lisfranc type fracture dislocation along the tarsal-metatarsal joint. There are small avulsed fragments along the lateral aspect of the cuboid which are minimally displaced. On the lateral view, there are some small fracture fragments dorsal to the cuneiforms favored to originate from the base of the second metatarsal. The second through fourth metatarsal bases are subluxed dorsally and the third through fifth metatarsal bases are dislocated laterally. There is generalized soft tissue swelling. CT may be helpful to better assess the articular relationships as well as evaluate for additional, occult fractures which are suspected. IMPRESSION: Lisfranc type fracture dislocation about the tarsal-metatarsal joint as discussed Electronically signed by:  Alejandro Freed MD  7/24/2020 10:59 PM CDT Workstation: [id number]    Xr Foot 2 View Right    Result Date: 7/25/2020  Narrative: EXAM DESCRIPTION: XR FOOT 2 VW RIGHT CLINICAL HISTORY: 20 years Female S/P reduction TECHNIQUE: Two views of the right foot. COMPARISON: 7/24/2020. FINDINGS: There has been partial closed reduction of the previously seen acute Lisfranc fracture-dislocation. There remains mild widening of the Lisfranc joint and slight lateral subluxation of the second through fifth metatarsals relative to the tarsal bones. Reduction interval reduction of the previously seen dorsal displacement. There is an acute osseous avulsion from the medial base of the second metatarsal. There are suspected small fracture fragments likely  arising from the cuboid, lateral cuneiform, and middle and/or medial cuneiforms. No aggressive osseous lesion. No no radiodense foreign body.     Impression: Interval partial closed reduction of the previously seen acute Lisfranc fracture-dislocation. Electronically signed by:  Emiliana Freeman MD  7/25/2020 1:17 AM CDT Workstation: [id number]    Xr Foot 3+ View Right    Result Date: 7/24/2020  Narrative: THREE-VIEW RIGHT ANKLE, THREE-VIEW RIGHT FOOT CLINICAL HISTORY: fall, pain FINDINGS RIGHT ANKLE: 3 views of the right ankle were obtained. No acute fracture or dislocation identified. Soft tissues appear unremarkable..     Impression: 1. No acute osseous finding.. FINDINGS RIGHT FOOT: 3 views of the right foot were obtained. There is a Lisfranc type fracture dislocation along the tarsal-metatarsal joint. There are small avulsed fragments along the lateral aspect of the cuboid which are minimally displaced. On the lateral view, there are some small fracture fragments dorsal to the cuneiforms favored to originate from the base of the second metatarsal. The second through fourth metatarsal bases are subluxed dorsally and the third through fifth metatarsal bases are dislocated laterally. There is generalized soft tissue swelling. CT may be helpful to better assess the articular relationships as well as evaluate for additional, occult fractures which are suspected. IMPRESSION: Lisfranc type fracture dislocation about the tarsal-metatarsal joint as discussed Electronically signed by:  Alejandro Freed MD  7/24/2020 10:59 PM CDT Workstation: [id number]    Ct Lower Extremity Right Without Contrast    Result Date: 7/25/2020  Narrative: HISTORY:  foot fracture, S93.324A Dislocation of tarsometatarsal joint of right foot, initial encounter PROCEDURE: CT LOWER EXTREMITY WITHOUT IV CONTRAST TECHNIQUE: Noncontrast axial, coronal, and sagittal images of the right foot were obtained.  This exam was performed according to our departmental  dose-optimization program, which includes automated exposure control, adjustment of the mA and/or kV according to patient size and/or use of iterative reconstruction technique. COMPARISON:  Plain films from earlier today INTERPRETATION: Examination is trace mildly displaced fractures of the second and third metatarsal bases, intermediate and lateral cuneiforms, and cuboid.  Mild lateral subluxation of the second through fifth metatarsals is also noted.  There is normal alignment of the first TMT joint.  There is generalized soft tissue swelling.  Posterior splint is in place.     Impression: Lisfranc fracture dislocation as above. Electronically signed by:  Earnest Jernigan MD  7/25/2020 2:03 AM CDT Workstation: [id number]          ASSESSMENT: Lisfranc fracture dislocation right foot.    We had a prolonged discussion concerning the natural history of the disorder and treatment options.  She understands it is likely she will have chronic problems with the foot from now on.  I think her best chance for good result would be with accurate reduction and stabilization of the tarsometatarsal joints.  Technique of surgery was discussed and x-rays were reviewed with her.  Need for prolonged protected weightbearing was discussed as well as possible need for further surgery including hardware removal or even arthrodesis.   Potential complications of surgery and anesthetic were reviewed in detail including but not limited to infection blood loss neurovascular damage sore throat pneumonia brain damage and even death.  She appears to understand all matters discussed and wishes to proceed.            There are no diagnoses linked to this encounter.      PLAN  There is no height or weight on file to calculate BMI.    No follow-ups on file.    Jon Mckeon MD

## 2020-07-31 ENCOUNTER — LAB (OUTPATIENT)
Dept: LAB | Facility: HOSPITAL | Age: 20
End: 2020-07-31

## 2020-07-31 PROCEDURE — C9803 HOPD COVID-19 SPEC COLLECT: HCPCS | Performed by: ORTHOPAEDIC SURGERY

## 2020-07-31 PROCEDURE — U0003 INFECTIOUS AGENT DETECTION BY NUCLEIC ACID (DNA OR RNA); SEVERE ACUTE RESPIRATORY SYNDROME CORONAVIRUS 2 (SARS-COV-2) (CORONAVIRUS DISEASE [COVID-19]), AMPLIFIED PROBE TECHNIQUE, MAKING USE OF HIGH THROUGHPUT TECHNOLOGIES AS DESCRIBED BY CMS-2020-01-R: HCPCS | Performed by: ORTHOPAEDIC SURGERY

## 2020-08-01 LAB
COVID LABCORP PRIORITY: NORMAL
SARS-COV-2 RNA RESP QL NAA+PROBE: NOT DETECTED

## 2020-08-03 ENCOUNTER — APPOINTMENT (OUTPATIENT)
Dept: GENERAL RADIOLOGY | Facility: HOSPITAL | Age: 20
End: 2020-08-03

## 2020-08-03 ENCOUNTER — HOSPITAL ENCOUNTER (OUTPATIENT)
Facility: HOSPITAL | Age: 20
Discharge: HOME | End: 2020-08-04
Attending: ORTHOPAEDIC SURGERY | Admitting: ORTHOPAEDIC SURGERY

## 2020-08-03 ENCOUNTER — ANESTHESIA (OUTPATIENT)
Dept: PERIOP | Facility: HOSPITAL | Age: 20
End: 2020-08-03

## 2020-08-03 ENCOUNTER — ANESTHESIA EVENT (OUTPATIENT)
Dept: PERIOP | Facility: HOSPITAL | Age: 20
End: 2020-08-03

## 2020-08-03 DIAGNOSIS — Z74.09 IMPAIRED FUNCTIONAL MOBILITY, BALANCE, GAIT, AND ENDURANCE: ICD-10-CM

## 2020-08-03 DIAGNOSIS — S93.324D LISFRANC DISLOCATION, RIGHT, SUBSEQUENT ENCOUNTER: Primary | ICD-10-CM

## 2020-08-03 DIAGNOSIS — S93.324A LISFRANC'S DISLOCATION, RIGHT, INITIAL ENCOUNTER: ICD-10-CM

## 2020-08-03 LAB
ANION GAP SERPL CALCULATED.3IONS-SCNC: 12 MMOL/L (ref 5–15)
B-HCG UR QL: NEGATIVE
BACTERIA UR QL AUTO: ABNORMAL /HPF
BASOPHILS # BLD AUTO: 0.03 10*3/MM3 (ref 0–0.2)
BASOPHILS NFR BLD AUTO: 0.5 % (ref 0–1.5)
BILIRUB UR QL STRIP: NEGATIVE
BUN SERPL-MCNC: 15 MG/DL (ref 6–20)
BUN/CREAT SERPL: 22.4 (ref 7–25)
CALCIUM SPEC-SCNC: 9.2 MG/DL (ref 8.6–10.5)
CHLORIDE SERPL-SCNC: 101 MMOL/L (ref 98–107)
CLARITY UR: ABNORMAL
CO2 SERPL-SCNC: 26 MMOL/L (ref 22–29)
COLOR UR: YELLOW
CREAT SERPL-MCNC: 0.67 MG/DL (ref 0.57–1)
DEPRECATED RDW RBC AUTO: 37.5 FL (ref 37–54)
EOSINOPHIL # BLD AUTO: 0.12 10*3/MM3 (ref 0–0.4)
EOSINOPHIL NFR BLD AUTO: 1.9 % (ref 0.3–6.2)
ERYTHROCYTE [DISTWIDTH] IN BLOOD BY AUTOMATED COUNT: 11.9 % (ref 12.3–15.4)
GFR SERPL CREATININE-BSD FRML MDRD: 112 ML/MIN/1.73
GLUCOSE SERPL-MCNC: 95 MG/DL (ref 65–99)
GLUCOSE UR STRIP-MCNC: NEGATIVE MG/DL
HCT VFR BLD AUTO: 40 % (ref 34–46.6)
HGB BLD-MCNC: 13.9 G/DL (ref 12–15.9)
HGB UR QL STRIP.AUTO: ABNORMAL
HYALINE CASTS UR QL AUTO: ABNORMAL /LPF
IMM GRANULOCYTES # BLD AUTO: 0 10*3/MM3 (ref 0–0.05)
IMM GRANULOCYTES NFR BLD AUTO: 0 % (ref 0–0.5)
KETONES UR QL STRIP: NEGATIVE
LEUKOCYTE ESTERASE UR QL STRIP.AUTO: NEGATIVE
LYMPHOCYTES # BLD AUTO: 1.65 10*3/MM3 (ref 0.7–3.1)
LYMPHOCYTES NFR BLD AUTO: 25.9 % (ref 19.6–45.3)
MCH RBC QN AUTO: 29.8 PG (ref 26.6–33)
MCHC RBC AUTO-ENTMCNC: 34.8 G/DL (ref 31.5–35.7)
MCV RBC AUTO: 85.8 FL (ref 79–97)
MONOCYTES # BLD AUTO: 0.53 10*3/MM3 (ref 0.1–0.9)
MONOCYTES NFR BLD AUTO: 8.3 % (ref 5–12)
NEUTROPHILS NFR BLD AUTO: 4.05 10*3/MM3 (ref 1.7–7)
NEUTROPHILS NFR BLD AUTO: 63.4 % (ref 42.7–76)
NITRITE UR QL STRIP: NEGATIVE
NRBC BLD AUTO-RTO: 0 /100 WBC (ref 0–0.2)
PH UR STRIP.AUTO: 7 [PH] (ref 5–9)
PLATELET # BLD AUTO: 205 10*3/MM3 (ref 140–450)
PMV BLD AUTO: 11.2 FL (ref 6–12)
POTASSIUM SERPL-SCNC: 3.8 MMOL/L (ref 3.5–5.2)
PROT UR QL STRIP: NEGATIVE
RBC # BLD AUTO: 4.66 10*6/MM3 (ref 3.77–5.28)
RBC # UR: ABNORMAL /HPF
REF LAB TEST METHOD: ABNORMAL
SODIUM SERPL-SCNC: 139 MMOL/L (ref 136–145)
SP GR UR STRIP: 1.02 (ref 1–1.03)
SQUAMOUS #/AREA URNS HPF: ABNORMAL /HPF
UROBILINOGEN UR QL STRIP: ABNORMAL
WBC # BLD AUTO: 6.38 10*3/MM3 (ref 3.4–10.8)
WBC UR QL AUTO: ABNORMAL /HPF

## 2020-08-03 PROCEDURE — 81001 URINALYSIS AUTO W/SCOPE: CPT | Performed by: ORTHOPAEDIC SURGERY

## 2020-08-03 PROCEDURE — 28606 TREAT FOOT DISLOCATION: CPT | Performed by: ORTHOPAEDIC SURGERY

## 2020-08-03 PROCEDURE — C1713 ANCHOR/SCREW BN/BN,TIS/BN: HCPCS | Performed by: ORTHOPAEDIC SURGERY

## 2020-08-03 PROCEDURE — G0378 HOSPITAL OBSERVATION PER HR: HCPCS

## 2020-08-03 PROCEDURE — 28615 REPAIR FOOT DISLOCATION: CPT | Performed by: SPECIALIST/TECHNOLOGIST, OTHER

## 2020-08-03 PROCEDURE — 25010000002 PROPOFOL 10 MG/ML EMULSION: Performed by: NURSE ANESTHETIST, CERTIFIED REGISTERED

## 2020-08-03 PROCEDURE — 81025 URINE PREGNANCY TEST: CPT | Performed by: ANESTHESIOLOGY

## 2020-08-03 PROCEDURE — 73630 X-RAY EXAM OF FOOT: CPT

## 2020-08-03 PROCEDURE — 25010000002 ONDANSETRON PER 1 MG: Performed by: NURSE ANESTHETIST, CERTIFIED REGISTERED

## 2020-08-03 PROCEDURE — 25010000002 MIDAZOLAM PER 1 MG: Performed by: NURSE ANESTHETIST, CERTIFIED REGISTERED

## 2020-08-03 PROCEDURE — 87086 URINE CULTURE/COLONY COUNT: CPT | Performed by: ORTHOPAEDIC SURGERY

## 2020-08-03 PROCEDURE — 94799 UNLISTED PULMONARY SVC/PX: CPT

## 2020-08-03 PROCEDURE — 80048 BASIC METABOLIC PNL TOTAL CA: CPT | Performed by: ORTHOPAEDIC SURGERY

## 2020-08-03 PROCEDURE — 28615 REPAIR FOOT DISLOCATION: CPT | Performed by: ORTHOPAEDIC SURGERY

## 2020-08-03 PROCEDURE — 99024 POSTOP FOLLOW-UP VISIT: CPT | Performed by: ORTHOPAEDIC SURGERY

## 2020-08-03 PROCEDURE — 25010000002 HYDROMORPHONE 1 MG/ML SOLUTION: Performed by: NURSE ANESTHETIST, CERTIFIED REGISTERED

## 2020-08-03 PROCEDURE — 25010000002 ROPIVACAINE PER 1 MG: Performed by: ANESTHESIOLOGY

## 2020-08-03 PROCEDURE — 25010000002 ONDANSETRON PER 1 MG: Performed by: ORTHOPAEDIC SURGERY

## 2020-08-03 PROCEDURE — 25010000002 DEXAMETHASONE PER 1 MG: Performed by: NURSE ANESTHETIST, CERTIFIED REGISTERED

## 2020-08-03 PROCEDURE — 76000 FLUOROSCOPY <1 HR PHYS/QHP: CPT

## 2020-08-03 PROCEDURE — 76942 ECHO GUIDE FOR BIOPSY: CPT | Performed by: ORTHOPAEDIC SURGERY

## 2020-08-03 PROCEDURE — 85025 COMPLETE CBC W/AUTO DIFF WBC: CPT | Performed by: ORTHOPAEDIC SURGERY

## 2020-08-03 PROCEDURE — 25010000002 FENTANYL CITRATE (PF) 100 MCG/2ML SOLUTION: Performed by: NURSE ANESTHETIST, CERTIFIED REGISTERED

## 2020-08-03 DEVICE — IMPLANTABLE DEVICE: Type: IMPLANTABLE DEVICE | Status: FUNCTIONAL

## 2020-08-03 DEVICE — SCRW CORT S/TAP 3.5X34MM: Type: IMPLANTABLE DEVICE | Status: FUNCTIONAL

## 2020-08-03 DEVICE — SCRW LK ST STRDRV 2.7X20MM: Type: IMPLANTABLE DEVICE | Status: FUNCTIONAL

## 2020-08-03 DEVICE — KWIRE TROC/TP SS 1.6X150MM NS: Type: IMPLANTABLE DEVICE | Status: FUNCTIONAL

## 2020-08-03 DEVICE — SCRW CORT S/TAP STRDRV 2.7X20MM: Type: IMPLANTABLE DEVICE | Status: FUNCTIONAL

## 2020-08-03 DEVICE — SCRW LK ST STRDRV 2.7X22MM: Type: IMPLANTABLE DEVICE | Status: FUNCTIONAL

## 2020-08-03 RX ORDER — CLINDAMYCIN PHOSPHATE 600 MG/50ML
600 INJECTION INTRAVENOUS ONCE
Status: COMPLETED | OUTPATIENT
Start: 2020-08-03 | End: 2020-08-03

## 2020-08-03 RX ORDER — ONDANSETRON 2 MG/ML
4 INJECTION INTRAMUSCULAR; INTRAVENOUS EVERY 6 HOURS PRN
Status: DISCONTINUED | OUTPATIENT
Start: 2020-08-03 | End: 2020-08-04 | Stop reason: HOSPADM

## 2020-08-03 RX ORDER — ONDANSETRON 4 MG/1
4 TABLET, FILM COATED ORAL EVERY 8 HOURS PRN
Status: DISCONTINUED | OUTPATIENT
Start: 2020-08-03 | End: 2020-08-03

## 2020-08-03 RX ORDER — DOCUSATE SODIUM 100 MG/1
200 CAPSULE, LIQUID FILLED ORAL 2 TIMES DAILY
Qty: 30 CAPSULE | Refills: 1 | Status: SHIPPED | OUTPATIENT
Start: 2020-08-03 | End: 2021-01-26

## 2020-08-03 RX ORDER — HYDROCODONE BITARTRATE AND ACETAMINOPHEN 7.5; 325 MG/1; MG/1
1-2 TABLET ORAL 4 TIMES DAILY PRN
Qty: 30 TABLET | Refills: 0 | Status: SHIPPED | OUTPATIENT
Start: 2020-08-03 | End: 2020-08-13

## 2020-08-03 RX ORDER — MORPHINE SULFATE 10 MG/ML
6 INJECTION INTRAMUSCULAR; INTRAVENOUS; SUBCUTANEOUS EVERY 4 HOURS PRN
Status: DISCONTINUED | OUTPATIENT
Start: 2020-08-03 | End: 2020-08-04 | Stop reason: HOSPADM

## 2020-08-03 RX ORDER — PROPOFOL 10 MG/ML
VIAL (ML) INTRAVENOUS AS NEEDED
Status: DISCONTINUED | OUTPATIENT
Start: 2020-08-03 | End: 2020-08-03 | Stop reason: SURG

## 2020-08-03 RX ORDER — SODIUM CHLORIDE 9 MG/ML
INJECTION, SOLUTION INTRAVENOUS CONTINUOUS PRN
Status: DISCONTINUED | OUTPATIENT
Start: 2020-08-03 | End: 2020-08-03 | Stop reason: SURG

## 2020-08-03 RX ORDER — MIDAZOLAM HYDROCHLORIDE 1 MG/ML
INJECTION INTRAMUSCULAR; INTRAVENOUS AS NEEDED
Status: DISCONTINUED | OUTPATIENT
Start: 2020-08-03 | End: 2020-08-03 | Stop reason: SURG

## 2020-08-03 RX ORDER — DOCUSATE SODIUM 100 MG/1
200 CAPSULE, LIQUID FILLED ORAL 2 TIMES DAILY
Status: DISCONTINUED | OUTPATIENT
Start: 2020-08-03 | End: 2020-08-04 | Stop reason: HOSPADM

## 2020-08-03 RX ORDER — ACETAMINOPHEN 325 MG/1
650 TABLET ORAL EVERY 6 HOURS PRN
Qty: 60 TABLET | Refills: 0 | Status: SHIPPED | OUTPATIENT
Start: 2020-08-03 | End: 2021-01-26

## 2020-08-03 RX ORDER — HYDROCODONE BITARTRATE AND ACETAMINOPHEN 7.5; 325 MG/1; MG/1
1 TABLET ORAL EVERY 4 HOURS PRN
Status: DISCONTINUED | OUTPATIENT
Start: 2020-08-03 | End: 2020-08-04 | Stop reason: HOSPADM

## 2020-08-03 RX ORDER — DEXAMETHASONE SODIUM PHOSPHATE 4 MG/ML
INJECTION, SOLUTION INTRA-ARTICULAR; INTRALESIONAL; INTRAMUSCULAR; INTRAVENOUS; SOFT TISSUE AS NEEDED
Status: DISCONTINUED | OUTPATIENT
Start: 2020-08-03 | End: 2020-08-03 | Stop reason: SURG

## 2020-08-03 RX ORDER — ACETAMINOPHEN 325 MG/1
650 TABLET ORAL EVERY 4 HOURS PRN
Status: DISCONTINUED | OUTPATIENT
Start: 2020-08-03 | End: 2020-08-04 | Stop reason: HOSPADM

## 2020-08-03 RX ORDER — ACETAMINOPHEN 650 MG/1
650 SUPPOSITORY RECTAL EVERY 4 HOURS PRN
Status: DISCONTINUED | OUTPATIENT
Start: 2020-08-03 | End: 2020-08-04 | Stop reason: HOSPADM

## 2020-08-03 RX ORDER — LIDOCAINE HYDROCHLORIDE 20 MG/ML
INJECTION, SOLUTION EPIDURAL; INFILTRATION; INTRACAUDAL; PERINEURAL AS NEEDED
Status: DISCONTINUED | OUTPATIENT
Start: 2020-08-03 | End: 2020-08-03 | Stop reason: SURG

## 2020-08-03 RX ORDER — ROPIVACAINE HYDROCHLORIDE 5 MG/ML
INJECTION, SOLUTION EPIDURAL; INFILTRATION; PERINEURAL
Status: COMPLETED | OUTPATIENT
Start: 2020-08-03 | End: 2020-08-03

## 2020-08-03 RX ORDER — NALOXONE HCL 0.4 MG/ML
0.4 VIAL (ML) INJECTION
Status: DISCONTINUED | OUTPATIENT
Start: 2020-08-03 | End: 2020-08-04 | Stop reason: HOSPADM

## 2020-08-03 RX ORDER — BISACODYL 5 MG/1
10 TABLET, DELAYED RELEASE ORAL DAILY PRN
Status: DISCONTINUED | OUTPATIENT
Start: 2020-08-03 | End: 2020-08-04 | Stop reason: HOSPADM

## 2020-08-03 RX ORDER — CLINDAMYCIN PHOSPHATE 600 MG/50ML
600 INJECTION INTRAVENOUS EVERY 8 HOURS
Status: COMPLETED | OUTPATIENT
Start: 2020-08-03 | End: 2020-08-04

## 2020-08-03 RX ORDER — SODIUM CHLORIDE, SODIUM GLUCONATE, SODIUM ACETATE, POTASSIUM CHLORIDE, AND MAGNESIUM CHLORIDE 526; 502; 368; 37; 30 MG/100ML; MG/100ML; MG/100ML; MG/100ML; MG/100ML
1000 INJECTION, SOLUTION INTRAVENOUS CONTINUOUS
Status: DISCONTINUED | OUTPATIENT
Start: 2020-08-03 | End: 2020-08-03

## 2020-08-03 RX ORDER — BUPIVACAINE HYDROCHLORIDE 5 MG/ML
INJECTION, SOLUTION EPIDURAL; INTRACAUDAL AS NEEDED
Status: DISCONTINUED | OUTPATIENT
Start: 2020-08-03 | End: 2020-08-03 | Stop reason: HOSPADM

## 2020-08-03 RX ORDER — ONDANSETRON 2 MG/ML
INJECTION INTRAMUSCULAR; INTRAVENOUS AS NEEDED
Status: DISCONTINUED | OUTPATIENT
Start: 2020-08-03 | End: 2020-08-03 | Stop reason: SURG

## 2020-08-03 RX ORDER — HYDROCODONE BITARTRATE AND ACETAMINOPHEN 7.5; 325 MG/1; MG/1
2 TABLET ORAL EVERY 4 HOURS PRN
Status: DISCONTINUED | OUTPATIENT
Start: 2020-08-03 | End: 2020-08-04 | Stop reason: HOSPADM

## 2020-08-03 RX ORDER — MAGNESIUM CARB/ALUMINUM HYDROX 105-160MG
150 TABLET,CHEWABLE ORAL ONCE
Status: COMPLETED | OUTPATIENT
Start: 2020-08-03 | End: 2020-08-03

## 2020-08-03 RX ORDER — MEPERIDINE HYDROCHLORIDE 25 MG/ML
12.5 INJECTION INTRAMUSCULAR; INTRAVENOUS; SUBCUTANEOUS
Status: DISCONTINUED | OUTPATIENT
Start: 2020-08-03 | End: 2020-08-03 | Stop reason: HOSPADM

## 2020-08-03 RX ORDER — FENTANYL CITRATE 50 UG/ML
INJECTION, SOLUTION INTRAMUSCULAR; INTRAVENOUS AS NEEDED
Status: DISCONTINUED | OUTPATIENT
Start: 2020-08-03 | End: 2020-08-03 | Stop reason: SURG

## 2020-08-03 RX ORDER — DEXTROSE AND SODIUM CHLORIDE 5; .45 G/100ML; G/100ML
100 INJECTION, SOLUTION INTRAVENOUS CONTINUOUS
Status: DISCONTINUED | OUTPATIENT
Start: 2020-08-03 | End: 2020-08-04 | Stop reason: HOSPADM

## 2020-08-03 RX ADMIN — LIDOCAINE HYDROCHLORIDE 50 MG: 20 INJECTION, SOLUTION EPIDURAL; INFILTRATION; INTRACAUDAL; PERINEURAL at 09:12

## 2020-08-03 RX ADMIN — SODIUM CHLORIDE: 900 INJECTION, SOLUTION INTRAVENOUS at 10:51

## 2020-08-03 RX ADMIN — DEXTROSE AND SODIUM CHLORIDE 100 ML/HR: 5; 450 INJECTION, SOLUTION INTRAVENOUS at 13:03

## 2020-08-03 RX ADMIN — ROPIVACAINE HYDROCHLORIDE 30 ML: 5 INJECTION, SOLUTION EPIDURAL; INFILTRATION; PERINEURAL at 08:36

## 2020-08-03 RX ADMIN — FENTANYL CITRATE 50 MCG: 50 INJECTION, SOLUTION INTRAMUSCULAR; INTRAVENOUS at 11:08

## 2020-08-03 RX ADMIN — DOCUSATE SODIUM 200 MG: 100 CAPSULE, LIQUID FILLED ORAL at 20:03

## 2020-08-03 RX ADMIN — HYDROCODONE BITARTRATE AND ACETAMINOPHEN 1 TABLET: 7.5; 325 TABLET ORAL at 15:34

## 2020-08-03 RX ADMIN — SODIUM CHLORIDE, SODIUM GLUCONATE, SODIUM ACETATE, POTASSIUM CHLORIDE, AND MAGNESIUM CHLORIDE: 526; 502; 368; 37; 30 INJECTION, SOLUTION INTRAVENOUS at 08:52

## 2020-08-03 RX ADMIN — HYDROCODONE BITARTRATE AND ACETAMINOPHEN 1 TABLET: 7.5; 325 TABLET ORAL at 20:03

## 2020-08-03 RX ADMIN — FENTANYL CITRATE 50 MCG: 50 INJECTION, SOLUTION INTRAMUSCULAR; INTRAVENOUS at 09:17

## 2020-08-03 RX ADMIN — ONDANSETRON 8 MG: 2 INJECTION INTRAMUSCULAR; INTRAVENOUS at 11:07

## 2020-08-03 RX ADMIN — ONDANSETRON 4 MG: 2 SOLUTION INTRAMUSCULAR; INTRAVENOUS at 13:53

## 2020-08-03 RX ADMIN — CLINDAMYCIN IN 5 PERCENT DEXTROSE 600 MG: 12 INJECTION, SOLUTION INTRAVENOUS at 16:31

## 2020-08-03 RX ADMIN — DEXAMETHASONE SODIUM PHOSPHATE 4 MG: 4 INJECTION, SOLUTION INTRAMUSCULAR; INTRAVENOUS at 09:27

## 2020-08-03 RX ADMIN — HYDROMORPHONE HYDROCHLORIDE 0.5 MG: 1 INJECTION, SOLUTION INTRAMUSCULAR; INTRAVENOUS; SUBCUTANEOUS at 11:17

## 2020-08-03 RX ADMIN — MIDAZOLAM HYDROCHLORIDE 2 MG: 2 INJECTION, SOLUTION INTRAMUSCULAR; INTRAVENOUS at 09:06

## 2020-08-03 RX ADMIN — HYDROMORPHONE HYDROCHLORIDE 0.5 MG: 1 INJECTION, SOLUTION INTRAMUSCULAR; INTRAVENOUS; SUBCUTANEOUS at 10:58

## 2020-08-03 RX ADMIN — MAGNESIUM CITRATE 150 ML: 1.75 LIQUID ORAL at 13:53

## 2020-08-03 RX ADMIN — CLINDAMYCIN IN 5 PERCENT DEXTROSE 600 MG: 12 INJECTION, SOLUTION INTRAVENOUS at 09:17

## 2020-08-03 RX ADMIN — PROPOFOL 150 MG: 10 INJECTION, EMULSION INTRAVENOUS at 09:12

## 2020-08-03 NOTE — ANESTHESIA PROCEDURE NOTES
Peripheral Block      Patient reassessed immediately prior to procedure    Patient location during procedure: post-op  Start time: 8/3/2020 8:31 AM  Stop time: 8/3/2020 8:37 AM  Reason for block: at surgeon's request and post-op pain management  Performed by  Anesthesiologist: Jefferson Stuart MD  Assisted by: Helene Cortez RN  Preanesthetic Checklist  Completed: patient identified, site marked, surgical consent, pre-op evaluation, timeout performed, IV checked, risks and benefits discussed and monitors and equipment checked  Prep:  Pt Position: left lateral decubitus  Sterile barriers:cap, gloves and mask  Prep: ChloraPrep  Patient monitoring: blood pressure monitoring and continuous pulse oximetry  Procedure  Sedation:no  Performed under: local infiltration  Guidance:ultrasound guided  ULTRASOUND INTERPRETATION.  Using ultrasound guidance a 21 G gauge needle was placed in close proximity to the sciatic nerve, at which point, under ultrasound guidance anesthetic was injected in the area of the nerve and spread of the anesthesia was seen on ultrasound in close proximity thereto.  There were no abnormalities seen on ultrasound; a digital image was taken; and the patient tolerated the procedure with no complications. Images:still images obtained, printed/placed on chart    Laterality:right  Block Type:popliteal  Injection Technique:single-shot  Needle Type:echogenic  Needle Gauge:21 G      Medications Used: ropivacaine (NAROPIN) 0.5 % injection, 30 mL  Med admintered at 8/3/2020 8:36 AM      Medications  Comment:Pt ID'd   Ultrasound guided  Needle seen throughout  Local infiltration appropriate    Post Assessment  Injection Assessment: negative aspiration for heme, no paresthesia on injection and incremental injection  Patient Tolerance:comfortable throughout block  Complications:no

## 2020-08-03 NOTE — PLAN OF CARE
Problem: Patient Care Overview  Goal: Plan of Care Review  Flowsheets  Taken 8/3/2020 1640  Progress: no change  Outcome Summary: vss, orif of RLE today, pain controlled, nausea improved, cont to monitor  Taken 8/3/2020 1307  Plan of Care Reviewed With: patient     Problem: Fall Risk (Adult)  Goal: Identify Related Risk Factors and Signs and Symptoms  Flowsheets (Taken 8/3/2020 1640)  Related Risk Factors (Fall Risk): history of falls  Signs and Symptoms (Fall Risk): presence of risk factors     Problem: Fall Risk (Adult)  Goal: Absence of Fall  Flowsheets (Taken 8/3/2020 1640)  Absence of Fall: making progress toward outcome     Problem: Surgery Nonspecified (Adult)  Goal: Signs and Symptoms of Listed Potential Problems Will be Absent, Minimized or Managed (Surgery Nonspecified)  Flowsheets (Taken 8/3/2020 1640)  Problems Assessed (Surgery): all  Problems Present (Surgery): pain

## 2020-08-03 NOTE — OP NOTE
Procedure(s):  FOOT MID OPEN REDUCTION INTERNAL FIXATION  Procedure Note    Tiffany Pruitt  8/3/2020    Pre-op Diagnosis: Fracture dislocation Lisfranc joint right foot first second third fourth and fifth tarsometatarsal joints.  Lisfranc's dislocation, right, initial encounter [S93.324A]    Post-op Diagnosis:   Same  Post-Op Diagnosis Codes:     * Lisfranc's dislocation, right, initial encounter [S93.324A]    Procedure/CPT® Codes: Open reduction and internal fixation tarsometatarsal joints #1 2 and 3 (CPT code 28615 x 3), reduction and percutaneous pinning tarsometatarsal dislocations fourth and fifth toes (CPT code 28636 x 2 )      Procedure(s):  FOOT MID OPEN REDUCTION INTERNAL FIXATION    Surgeon(s):  Jon Mckeon MD    Anesthesia: Choice    Staff:   Circulator: Susannah Ring RN  Radiology Technologist: Tamara Burton  Scrub Person: Niurka Wilder  Assistant: Lanette Lr CSA    Assistant: Lanette Lr CSA was responsible for performing the following activities: Suction, Irrigation, Suturing, Closing and Placing Dressing and their skilled assistance was necessary for the success of this case.     Estimated Blood Loss: Less than 20 cc    Specimens:    None                      Drains: None    Findings: Subluxated tarsometatarsal joints, fracture lateral cuneiform.    Complications: None    INDICATIONS : The patient is a 20-year-old female who sustained the above injury.  Treatment options were reviewed and I recommended the above procedures.  Anticipated benefits of surgery as well as potential complications of surgery and anesthetic were reviewed in detail and she appeared to understand all matters discussed and wished to proceed.      Operative Report: The patient was brought to the operating room.  The timeout procedure was followed.  General anesthesia was induced.  A tourniquet was placed on the right thigh.  The splints were removed from the right lower  extremity.  Skin was remarkable only for resolving ecchymosis but no blistering.  The limb was prepped and drapes applied.    The limb was exsanguinated and the pneumatic tourniquet inflated.  The arm was brought into position to permit appropriate the location of her incisions.  A dorsomedial incision was made over the foot centered over the first tarsometatarsal joint.  Bleeding points were cauterized.  Subcutaneous nerves were identified and protected.  The long extensor of the great toe was retracted.  The first tarsometatarsal joint was identified.  The wound was irrigated.  The joint was manipulated.  A percutaneous K wire was placed from distal to proximal medially.  The arm was brought into position and satisfactory alignment of the joint was confirmed.  A smooth quiet K wire was then advanced medial aspect of the medial cuneiform directed towards the base of the second metatarsal.  I chose as the implant for fixation locking 2.7 mm plates manufactured by the [employer].  Combination of locking and traditional screws were used to secure the plate.  The smooth K wire previously placed between the medial cuneiform and base of the second metatarsal was driven distally.  The smooth K wires and replaced with a 3.5 mm cortical screw.  The C-arm was again brought into position and position was found to be satisfactory.    A second longitudinal incision was made over the dorsal lateral aspect of the foot centered over the third tarsometatarsal joint.  Bleeding points were cauterized and cutaneous nerves were identified and protected.  The second and third tarsometatarsal joints were then identified.  Joints were manipulated and smooth K wire was placed provisionally stabilize the joints.  A locking plate was then placed stabilizing both joints.    The alignment of the fourth and fifth tarsometatarsal joints was then inspected and position was satisfactory.  K wires were placed percutaneously to transfix the  fourth and fifth tarsometatarsal joints.  The final position of the fractures and hardware were satisfactory C arm in multiple planes.  The wires in the fourth and fifth metatarsals were then bent outside the skin and cut short.  Skin was released around the pins as needed.  Remaining smooth K wires were removed.  The wounds were irrigated and bleeding points cauterized.    Cutaneous tissues were closed with Vicryl.  Skin closure was with interrupted nylon suture.  The skin of the dorsum of the midfoot was then injected with Marcaine.  A bulky soft dressing was applied followed by a short leg posterior splint.  The tourniquet was deflated with prompt return of capillary refill.  Subsequently an anterior fiberglass slab was added to the splinting construct.    Patient was transported to the recovery room in stable condition.  There were no intraoperative or immediate postoperative complications.  Estimated blood loss less than 20 cc.    Jon Mckeon MD  08/03/20  11:38

## 2020-08-03 NOTE — SIGNIFICANT NOTE
08/03/20 0807   Rehab Time/Intention   Evaluation Not Performed patient/family declined evaluation   Rehab Treatment   Discipline physical therapist     Pt deferred PT eval and crutch training as she has been using crutches since her injury 7/31. Observed pt perform correct transfer and ambulation technique with crutches while maintaining NWB status on RLE. Educated pt to lower her crutch ht to prevent underarm/nerve irritation with good response.

## 2020-08-03 NOTE — ANESTHESIA POSTPROCEDURE EVALUATION
Patient: Tiffany Pruitt    Procedure Summary     Date:  08/03/20 Room / Location:  [facility] OR [address] [place] [address] OR    Anesthesia Start:  0906 Anesthesia Stop:  1148    Procedure:  FOOT MID OPEN REDUCTION INTERNAL FIXATION (Right ) Diagnosis:       Lisfranc's dislocation, right, initial encounter      (Lisfranc's dislocation, right, initial encounter [S93.324A])    Surgeon:  Jon Mckeon MD Provider:  Jefferson Stuart MD    Anesthesia Type:  general with block ASA Status:  2          Anesthesia Type: general with block    Vitals  Vitals Value Taken Time   BP 97/50 8/3/2020 11:37 AM   Temp 97.3 °F (36.3 °C) 8/3/2020 11:37 AM   Pulse 85 8/3/2020 11:37 AM   Resp 20 8/3/2020 11:37 AM   SpO2 98 % 8/3/2020 11:37 AM           Post Anesthesia Care and Evaluation    Patient location during evaluation: bedside  Patient participation: complete - patient cannot participate  Level of consciousness: awake  Pain score: 0  Pain management: adequate  Airway patency: patent  Anesthetic complications: No anesthetic complications  PONV Status: none  Cardiovascular status: acceptable  Respiratory status: acceptable  Hydration status: acceptable

## 2020-08-03 NOTE — ANESTHESIA PREPROCEDURE EVALUATION
Anesthesia Evaluation     no history of anesthetic complications:  NPO Solid Status: > 8 hours  NPO Liquid Status: > 8 hours           Airway   Mallampati: I  TM distance: >3 FB  Neck ROM: full  No difficulty expected  Dental - normal exam     Pulmonary - normal exam    breath sounds clear to auscultation  (+) asthma (as a child),  (-) sleep apnea, not a smoker  Cardiovascular - normal exam  Exercise tolerance: good (4-7 METS)    Rhythm: regular  Rate: normal    (-) hypertension, valvular problems/murmurs, dysrhythmias, angina, DVT, hyperlipidemia      Neuro/Psych  (-) seizures, TIA, CVA, headaches, numbness, psychiatric history  GI/Hepatic/Renal/Endo    (-) GERD, hepatitis, liver disease, no renal disease, diabetes, no thyroid disorder    Musculoskeletal         ROS comment: Right foot dislocation    1. No acute osseous finding..        FINDINGS RIGHT FOOT: 3 views of the right foot were obtained.  There is a Lisfranc type fracture dislocation along the  tarsal-metatarsal joint. There are small avulsed fragments along the lateral aspect of the cuboid which are minimally displaced.  On the lateral view, there are some small fracture fragments dorsal to the cuneiforms favored to originate from the base of the second metatarsal. The second through fourth metatarsal bases are subluxed dorsally and the third through fifth metatarsal bases are dislocated laterally. There is generalized soft tissue swelling. CT may be helpful to better assess the articular  relationships as well as evaluate for additional, occult  fractures which are suspected.  Abdominal    Substance History   (+) alcohol use (occ),   (-) drug use     OB/GYN    (-)  Pregnant        Other        (-) history of cancer                  Anesthesia Plan    ASA 2     general with block   (Popliteal block discussed and patient agrees to proceed)  intravenous induction     Anesthetic plan, all risks, benefits, and alternatives have been provided, discussed and  informed consent has been obtained with: patient and father.

## 2020-08-03 NOTE — PROGRESS NOTES
ORTHOPEDIC PROGRESS NOTE:    Name:  Tiffany Pruitt  Date:    8/3/2020  Date of admission:  8/3/2020    Post op day:  Day of Surgery  Procedure:    Procedure(s) (LRB):  FOOT MID OPEN REDUCTION INTERNAL FIXATION (Right)    Subjective: She is having no pain.  She complains mildly of nausea as well as of constipation.  Vitals:    Vitals:    08/03/20 1223   BP: 107/61   Pulse: 92   Resp: 18   Temp: 97.9 °F (36.6 °C)   SpO2: 98%       Exam: She is drowsy but awake and responds appropriately to questions and commands.    Her toes are warm and pink.  There is diffuse hypaesthesia soft touch in the foot.  Her dressings are clean and dry.    Lab Results (last 24 hours)     Procedure Component Value Units Date/Time    Basic metabolic panel [[id number]]  (Normal) Collected:  08/03/20 0735    Specimen:  Blood Updated:  08/03/20 0804     Glucose 95 mg/dL      BUN 15 mg/dL      Creatinine 0.67 mg/dL      Sodium 139 mmol/L      Potassium 3.8 mmol/L      Chloride 101 mmol/L      CO2 26.0 mmol/L      Calcium 9.2 mg/dL      eGFR Non African Amer 112 mL/min/1.73      BUN/Creatinine Ratio 22.4     Anion Gap 12.0 mmol/L     Narrative:       GFR Normal >60  Chronic Kidney Disease <60  Kidney Failure <15      Pregnancy, Urine - Urine, Clean Catch [[id number]]  (Normal) Collected:  08/03/20 0734    Specimen:  Urine, Clean Catch Updated:  08/03/20 0747     HCG, Urine QL Negative    Urinalysis, Microscopic Only - Urine, Clean Catch [[id number]]  (Abnormal) Collected:  08/03/20 0734    Specimen:  Urine, Clean Catch Updated:  08/03/20 0744     RBC, UA 13-20 /HPF      WBC, UA 13-20 /HPF      Bacteria, UA 1+ /HPF      Squamous Epithelial Cells, UA 6-12 /HPF      Hyaline Casts, UA 7-12 /LPF      Methodology Automated Microscopy    Urinalysis With Culture If Indicated - Urine, Clean Catch [[id number]]  (Abnormal) Collected:  08/03/20 0734    Specimen:  Urine, Clean Catch Updated:  08/03/20 0744     Color, UA Yellow     Appearance, UA Cloudy      pH, UA 7.0     Specific Gravity, UA 1.022     Glucose, UA Negative     Ketones, UA Negative     Bilirubin, UA Negative     Blood, UA Trace     Protein, UA Negative     Leuk Esterase, UA Negative     Nitrite, UA Negative     Urobilinogen, UA 1.0 E.U./dL    Urine Culture - Urine, Urine, Clean Catch [[id number]] Collected:  08/03/20 0734    Specimen:  Urine, Clean Catch Updated:  08/03/20 0744    CBC and Differential [[id number]] Collected:  08/03/20 0736    Specimen:  Blood Updated:  08/03/20 0738    Narrative:       The following orders were created for panel order CBC and Differential.  Procedure                               Abnormality         Status                     ---------                               -----------         ------                     CBC Auto Differential[[id number]]        Abnormal            Final result                 Please view results for these tests on the individual orders.    CBC Auto Differential [[id number]]  (Abnormal) Collected:  08/03/20 0736    Specimen:  Blood Updated:  08/03/20 0738     WBC 6.38 10*3/mm3      RBC 4.66 10*6/mm3      Hemoglobin 13.9 g/dL      Hematocrit 40.0 %      MCV 85.8 fL      MCH 29.8 pg      MCHC 34.8 g/dL      RDW 11.9 %      RDW-SD 37.5 fl      MPV 11.2 fL      Platelets 205 10*3/mm3      Neutrophil % 63.4 %      Lymphocyte % 25.9 %      Monocyte % 8.3 %      Eosinophil % 1.9 %      Basophil % 0.5 %      Immature Grans % 0.0 %      Neutrophils, Absolute 4.05 10*3/mm3      Lymphocytes, Absolute 1.65 10*3/mm3      Monocytes, Absolute 0.53 10*3/mm3      Eosinophils, Absolute 0.12 10*3/mm3      Basophils, Absolute 0.03 10*3/mm3      Immature Grans, Absolute 0.00 10*3/mm3      nRBC 0.0 /100 WBC           ASSESSMENT: 1 satisfactory progress following open reduction right foot.  2 postoperative nausea.  3 constipation.      Principal Problem:    Luis Carlos's dislocation, right, initial encounter        PLAN: Orders have been written for treatment of her nausea and  constipation.  She and her family understands that when her block wears off she may have considerable pain possibly not controllable with oral medication.  I recommended motion in observation status overnight for pain control.  She is concerned about the financial consequences of a stay overnight.  We will attempt to find her more information about this.  She was instructed in care of her splint.  She was also instructed in touchdown weightbearing only on the right.      Jon Mckeon MD  08/03/20  13:46

## 2020-08-03 NOTE — INTERVAL H&P NOTE
There has been no change from previously dictated history and physical.  Skin in the foot is remarkable only for resolving ecchymosis but no blistering.  The timeout procedure was performed.    Visit Vitals  LMP 07/15/2020

## 2020-08-03 NOTE — ANESTHESIA PROCEDURE NOTES
Airway  Urgency: elective    Date/Time: 8/3/2020 9:13 AM    General Information and Staff    Patient location during procedure: OR  CRNA: Bhanu Conner CRNA    Indications and Patient Condition  Indications for airway management: airway protection    Preoxygenated: yes  Mask difficulty assessment: 0 - not attempted    Final Airway Details  Final airway type: supraglottic airway      Successful airway: Size 4    Number of attempts at approach: 1  Assessment: lips, teeth, and gum same as pre-op

## 2020-08-04 VITALS
SYSTOLIC BLOOD PRESSURE: 146 MMHG | BODY MASS INDEX: 27.11 KG/M2 | TEMPERATURE: 98 F | WEIGHT: 158.8 LBS | RESPIRATION RATE: 18 BRPM | OXYGEN SATURATION: 98 % | HEART RATE: 98 BPM | HEIGHT: 64 IN | DIASTOLIC BLOOD PRESSURE: 79 MMHG

## 2020-08-04 LAB — BACTERIA SPEC AEROBE CULT: NO GROWTH

## 2020-08-04 PROCEDURE — 97162 PT EVAL MOD COMPLEX 30 MIN: CPT

## 2020-08-04 PROCEDURE — 99024 POSTOP FOLLOW-UP VISIT: CPT | Performed by: ORTHOPAEDIC SURGERY

## 2020-08-04 PROCEDURE — 25010000002 MORPHINE PER 10 MG: Performed by: ORTHOPAEDIC SURGERY

## 2020-08-04 PROCEDURE — 97530 THERAPEUTIC ACTIVITIES: CPT

## 2020-08-04 PROCEDURE — 97116 GAIT TRAINING THERAPY: CPT

## 2020-08-04 RX ADMIN — HYDROCODONE BITARTRATE AND ACETAMINOPHEN 1 TABLET: 7.5; 325 TABLET ORAL at 00:05

## 2020-08-04 RX ADMIN — CLINDAMYCIN IN 5 PERCENT DEXTROSE 600 MG: 12 INJECTION, SOLUTION INTRAVENOUS at 00:05

## 2020-08-04 RX ADMIN — DOCUSATE SODIUM 200 MG: 100 CAPSULE, LIQUID FILLED ORAL at 08:22

## 2020-08-04 RX ADMIN — HYDROCODONE BITARTRATE AND ACETAMINOPHEN 1 TABLET: 7.5; 325 TABLET ORAL at 04:13

## 2020-08-04 RX ADMIN — BISACODYL 10 MG: 5 TABLET, COATED ORAL at 08:30

## 2020-08-04 RX ADMIN — HYDROCODONE BITARTRATE AND ACETAMINOPHEN 2 TABLET: 7.5; 325 TABLET ORAL at 12:56

## 2020-08-04 RX ADMIN — DEXTROSE AND SODIUM CHLORIDE 100 ML/HR: 5; 450 INJECTION, SOLUTION INTRAVENOUS at 00:05

## 2020-08-04 RX ADMIN — MORPHINE SULFATE 4 MG: 4 INJECTION INTRAVENOUS at 06:41

## 2020-08-04 RX ADMIN — HYDROCODONE BITARTRATE AND ACETAMINOPHEN 2 TABLET: 7.5; 325 TABLET ORAL at 08:22

## 2020-08-04 NOTE — PLAN OF CARE
Problem: Patient Care Overview  Goal: Plan of Care Review  8/4/2020 1554 by Tammy Szymanski, PT  Flowsheets  Taken 8/4/2020 1554  Plan of Care Reviewed With: patient  Taken 8/4/2020 7814  Outcome Summary: PT evaluation completed. Pt pleasant and agreeable to therapy. Reports recently had pain med. Pt independent with bed mobility and transfers to chair and to/from toilet. Pt ambulated with crutches 150ft with conditional indpendence. Pt ascended/descended 5 steps with crutches with CGA to SBA. Pt reports good support system and will have assistaance as needed going in/out of apartment. Pt tolerated evaluation and crutch training well and has met all goals. Anticipate discharge home with assistance as needed.

## 2020-08-04 NOTE — PROGRESS NOTES
ORTHOPEDIC PROGRESS NOTE:    Name:  Tiffany Pruitt  Date:    8/4/2020  Date of admission:  8/3/2020    Post op day:  1 Day Post-Op  Procedure:    Procedure(s) (LRB):  FOOT MID OPEN REDUCTION INTERNAL FIXATION (Right)    Subjective: She said her block began wearing off earlier this morning.  She finds the oral pain medication more effective than IV.  She is voiding fine but has yet to have a bowel movement.    Vitals:    Vitals:    08/04/20 0409   BP: 109/57   Pulse: 84   Resp: 18   Temp: 96.6 °F (35.9 °C)   SpO2: 98%       Exam: She is alert and in no apparent distress.  Her dressings are clean and dry.  Toes are warm and pink.  Sensory exam is intact to soft touch.    Lab Results (last 24 hours)     Procedure Component Value Units Date/Time    Basic metabolic panel [[id number]]  (Normal) Collected:  08/03/20 0735    Specimen:  Blood Updated:  08/03/20 0804     Glucose 95 mg/dL      BUN 15 mg/dL      Creatinine 0.67 mg/dL      Sodium 139 mmol/L      Potassium 3.8 mmol/L      Chloride 101 mmol/L      CO2 26.0 mmol/L      Calcium 9.2 mg/dL      eGFR Non African Amer 112 mL/min/1.73      BUN/Creatinine Ratio 22.4     Anion Gap 12.0 mmol/L     Narrative:       GFR Normal >60  Chronic Kidney Disease <60  Kidney Failure <15            ASSESSMENT: 1 satisfactory progress following open reduction right foot.  2 constipation.      Principal Problem:    Lisfranc's dislocation, right, initial encounter  Active Problems:    Lisfranc dislocation, right, subsequent encounter        PLAN: Discharge to home this morning after physical therapy.  She was instructed in activity restriction as well as bowel care.    Follow-up in office.      Jon Mckeon MD  08/04/20  07:57

## 2020-08-04 NOTE — PLAN OF CARE
Problem: Patient Care Overview  Goal: Plan of Care Review  Outcome: Ongoing (interventions implemented as appropriate)  Flowsheets (Taken 8/4/2020 0029)  Progress: no change  Plan of Care Reviewed With: patient  Outcome Summary: Patients pain is controlled, attempting to stay on a schedule of receiving Norco q4. No other complaints. Patient ambulates well with crutches. VSS, will continue to monitor.

## 2020-08-04 NOTE — DISCHARGE SUMMARY
ORTHOPEDIC DISCHARGE SUMMARY    NAME: Tiffany Pruitt   PHYSICIAN: Jno Mckeon MD  : 2000  MRN: [id number]    ADMITTED: 8/3/2020   DISCHARGED: 2020    DISCHARGE DIAGNOSES:     Lisfranc's dislocation, right, initial encounter    Lisfranc dislocation, right, subsequent encounter      SERVICE: ORTHOPEDIC: Attending, Jon Mckeon MD    CONSULTS:     PROCEDURES: Procedure(s):  FOOT MID OPEN REDUCTION INTERNAL FIXATION performed 3 August 2020    LABS:   Lab Results (last 24 hours)     Procedure Component Value Units Date/Time    Basic metabolic panel [[id number]]  (Normal) Collected:  20 0735    Specimen:  Blood Updated:  20 08     Glucose 95 mg/dL      BUN 15 mg/dL      Creatinine 0.67 mg/dL      Sodium 139 mmol/L      Potassium 3.8 mmol/L      Chloride 101 mmol/L      CO2 26.0 mmol/L      Calcium 9.2 mg/dL      eGFR Non African Amer 112 mL/min/1.73      BUN/Creatinine Ratio 22.4     Anion Gap 12.0 mmol/L     Narrative:       GFR Normal >60  Chronic Kidney Disease <60  Kidney Failure <15            SUMMARY: The patient is a 20-year-old female admitted for open reduction internal fixation of tarsometatarsal injuries on the right.  Past medical history as documented in admitting history and physical.    On the day of admission she was taken to the operating room where under combination general and regional anesthetic the above-noted procedures were was performed.  There were no complications.  She was admitted overnight for observation for pain control.  Her block resolved on the first postoperative day.  Pain control was satisfactory with oral medications.  Physical therapy was begun for gait and transfer training and she had satisfactory progress with this.    DISPOSITION: She was discharged to home.  She was instructed in care of her splint and will maintain touchdown weightbearing only.  She was instructed in symptomatic care including ice and elevation.  She was given a  prescription for hydrocodone as well as for Dulcolax tablets and suppositories as well as Colace.    Return to see me in about 9 days for wound check.    DISCHARGE MEDICATIONS     Discharge Medications      New Medications      Instructions Start Date   acetaminophen 325 MG tablet  Commonly known as:  TYLENOL   650 mg, Oral, Every 6 Hours PRN      docusate sodium 100 MG capsule  Commonly known as:  COLACE   200 mg, Oral, 2 Times Daily      HYDROcodone-acetaminophen 7.5-325 MG per tablet  Commonly known as:  NORCO   1-2 tablets, Oral, 4 Times Daily PRN         ASK your doctor about these medications      Instructions Start Date   ondansetron 4 MG tablet  Commonly known as:  ZOFRAN   4 mg, Oral, Every 8 Hours PRN             INSTRUCTIONS:  Activity: She may be up touchdown weightbearing on the right.  She will keep her splint clean and dry.  Activity Instructions     Patient May Shower; No Tub Baths, Pools or Hot Tubs          Diet: Regular  Diet Instructions     Advance Diet as Tolerated          Condition: Stable  Special instructions: Patient instructed to call office or call physician through hospital  [phone].    FOLLOW UP:   Additional Instructions for the Follow-ups that You Need to Schedule     Apply Ice to Affected Ankle / Foot Every 2 Hours   As directed      Discharge Follow-up with Specified Provider: levi   As directed      To:  levi    Follow Up Details:  10 days           Follow-up Information     Provider, No Known .    Contact information:  [facility] [address]                   Jon Mckeon MD  08/04/20  07:59

## 2020-08-04 NOTE — THERAPY EVALUATION
Patient Name: Tiffany Pruitt  : 2000    MRN: [id number]                              Today's Date: 2020       Admit Date: 8/3/2020    Visit Dx:     ICD-10-CM ICD-9-CM   1. Lisfranc dislocation, right, subsequent encounter S93.324D V58.89   2. Lisfranc's dislocation, right, initial encounter S93.324A 838.03   3. Impaired functional mobility, balance, gait, and endurance Z74.09 V49.89     Patient Active Problem List   Diagnosis   • Encounter for initial prescription of injectable contraceptive   • Lisfranc dislocation, right, subsequent encounter   • Right foot pain   • Lisfranc's dislocation, right, initial encounter     Past Medical History:   Diagnosis Date   • Allergic rhinitis    • Asthma    • Bronchitis    • Candidiasis of urogenital site    • Encounter for screening for infections with a predominantly sexual mode of transmission    • Fever    • Hemangioma     Hemangioma   • Menorrhagia    • Miscarriage    • Oral contraceptive prescribed    • Other sex counseling    • Otitis media    • Upper respiratory infection    • Urticaria      Past Surgical History:   Procedure Laterality Date   • DENTAL PROCEDURE     • INJECTION OF MEDICATION  2015    Depo Provera (medroxyprogesterone acetate) (2)     • SKIN BIOPSY  2000    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 [facility]     General Information     Row Name 20 1334          PT Evaluation Time/Intention    Document Type  evaluation  -TAY     Mode of Treatment  physical therapy;individual therapy  -     Row Name 20 3869          General Information    Patient Profile Reviewed?  yes  -TAY     Prior Level of Function  independent:;gait;transfer;ADL's;driving  -TAY     Existing Precautions/Restrictions  fall  -TAY     Row Name 20 0317          Relationship/Environment    Lives With  significant other   -TAY     Row Name 08/04/20 1334          Resource/Environmental Concerns    Current Living Arrangements  home/apartment/condo duplex  -TAY     Row Name 08/04/20 1334          Home Main Entrance    Number of Stairs, Main Entrance  two  -TAY     Stair Railings, Main Entrance  none  -TAY     Row Name 08/04/20 1334          Stairs Within Home, Primary    Number of Stairs, Within Home, Primary  none  -TAY     Row Name 08/04/20 1334          Cognitive Assessment/Intervention- PT/OT    Orientation Status (Cognition)  oriented x 4  -       User Key  (r) = Recorded By, (t) = Taken By, (c) = Cosigned By    Initials Name Provider Type    TAY Tammy Szymanski PT Physical Therapist        Mobility     Row Name 08/04/20 1334          Bed Mobility Assessment/Treatment    Bed Mobility Assessment/Treatment  supine-sit;sit-supine  -     Supine-Sit [place] (Bed Mobility)  independent  -     Sit-Supine [place] (Bed Mobility)  independent  -     Row Name 08/04/20 1334          Bed-Chair Transfer    Bed-Chair [place] (Transfers)  conditional independence chair, toilet  -     Assistive Device (Bed-Chair Transfers)  crutches, axillary  -     Row Name 08/04/20 1334          Sit-Stand Transfer    Sit-Stand [place] (Transfers)  conditional independence  -     Assistive Device (Sit-Stand Transfers)  crutches, axillary  -     Row Name 08/04/20 1334          Gait/Stairs Assessment/Training    Gait/Stairs Assessment/Training  gait/ambulation assistive device  -     [place] Level (Gait)  conditional independence  -     Assistive Device (Gait)  crutches, axillary  -     Distance in Feet (Gait)  150ft,20ftx2  -     [place] Level (Stairs)  contact guard;verbal cues;nonverbal cues (demo/gesture)  -     Assistive Device (Stairs)  crutches, axillary  -     Number of Steps (Stairs)  5  -     Comment (Gait/Stairs)  Pt verbalized understanding of touchdown wt bearing, but preferred to keep foot off the  floor at evaluation as foot did not throb as much.  -       User Key  (r) = Recorded By, (t) = Taken By, (c) = Cosigned By    Initials Name Provider Type    Tammy Jackson PT Physical Therapist        Obj/Interventions     Row Name 08/04/20 1344 08/04/20 1334       General ROM    GENERAL ROM COMMENTS  --  -TAY  BUE: AROM WFL; BLE: RLE: AROM WFL knee, hip; ankle/foot not tested due to splint and s/p surgery(incidentally noted toes warm to touch with good capillary filling) ;LLE: AROM WFL  -    Row Name 08/04/20 1344 08/04/20 1334       MMT (Manual Muscle Testing)    General MMT Comments  --  -TAY  BUE: grossly 5/5; BLE: RLE: 4/5 knee, hip, ankle/foot not tested due to s/p surgery/splinting; BLE: 4/5 ankle/foot, knee, hip  -[facility] Name 08/04/20 1334          Static Sitting Balance    Level of [place] (Unsupported Sitting, Static Balance)  independent  -     Sitting Position (Unsupported Sitting, Static Balance)  sitting on edge of bed  -     Time Able to Maintain Position (Unsupported Sitting, Static Balance)  more than 5 minutes  -[facility] Name 08/04/20 1334          Dynamic Sitting Balance    Level of [place], Reaches Outside Midline (Sitting, Dynamic Balance)  independent  -     Sitting Position, Reaches Outside Midline (Sitting, Dynamic Balance)  sitting on edge of bed  -[facility] Name 08/04/20 1334          Static Standing Balance    Level of [place] (Supported Standing, Static Balance)  conditional independence  -     Assistive Device Utilized (Supported Standing, Static Balance)  cruthces, axillary  -[facility] Name 08/04/20 1334          Dynamic Standing Balance    Level of [place], Reaches Outside Midline (Standing, Dynamic Balance)  standby assist;contact guard assist  -     Assistive Device Utilized (Supported Standing, Dynamic Balance)  amer[employer] axillary  -[facility] Name 08/04/20 1334          Sensory Assessment/Intervention    Sensory General Assessment  no  sensation deficits identified to light touch  -TAY       User Key  (r) = Recorded By, (t) = Taken By, (c) = Cosigned By    Initials Name Provider Type    TAY Tammy Szymanski, PT Physical Therapist        Goals/Plan     Row Name 08/04/20 1334          Bed Mobility Goal 1 (PT)    Activity/Assistive Device (Bed Mobility Goal 1, PT)  sit to supine;supine to sit  -TAY     [place] Level/Cues Needed (Bed Mobility Goal 1, PT)  conditional independence  -TAY     Time Frame (Bed Mobility Goal 1, PT)  by discharge  -TAY     Progress/Outcomes (Bed Mobility Goal 1, PT)  (S) goal met  -[facility] Name 08/04/20 1334          Transfer Goal 1 (PT)    Activity/Assistive Device (Transfer Goal 1, PT)  sit-to-stand/stand-to-sit;bed-to-chair/chair-to-bed;toilet;crutches, axillary  -TAY     [place] Level/Cues Needed (Transfer Goal 1, PT)  conditional independence  -TAY     Time Frame (Transfer Goal 1, PT)  by discharge  -TAY     Progress/Outcome (Transfer Goal 1, PT)  (S) goal met  -[facility] Name 08/04/20 1334          Gait Training Goal 1 (PT)    Activity/Assistive Device (Gait Training Goal 1, PT)  gait (walking locomotion);crutches, axillary  -TAY     [place] Level (Gait Training Goal 1, PT)  conditional independence  -TAY     Distance (Gait Goal 1, PT)  150ft or more  -TAY     Time Frame (Gait Training Goal 1, PT)  by discharge  -TAY     Progress/Outcome (Gait Training Goal 1, PT)  (S) goal met  -[facility] Name 08/04/20 1334          Stairs Goal 1 (PT)    Activity/Assistive Device (Stairs Goal 1, PT)  ascending stairs;descending stairs;crutches, axillary  -TAY     [place] Level/Cues Needed (Stairs Goal 1, PT)  contact guard assist;standby assist  -TAY     Number of Stairs (Stairs Goal 1, PT)  at least 3  -TAY     Time Frame (Stairs Goal 1, PT)  by discharge  -TAY     Progress/Outcome (Stairs Goal 1, PT)  (S) goal met  -       User Key  (r) = Recorded By, (t) = Taken By, (c) = Cosigned By    Initials Name Provider Type    TAY  Tammy Szymanski, PT Physical Therapist        Clinical Impression     Row Name 08/04/20 1335          Pain Assessment    Additional Documentation  Pain Scale: Numbers Pre/Post-Treatment (Group)  -     Row Name 08/04/20 3702          Pain Scale: Numbers Pre/Post-Treatment    Pain Scale: Numbers, Pretreatment  9/10  -     Pain Scale: Numbers, Post-Treatment  5/10  -TAY     Pain Location - Side  Right  -     Pain Location  foot  -     Pain Intervention(s)  Medication (See MAR);Repositioned;Rest  -     Row Name 08/04/20 8991          Plan of Care Review    Plan of Care Reviewed With  patient  -TAY     Outcome Summary  PT evaluation completed. Pt pleasant and agreeable to therapy. Reports recently had pain med. Pt independent with bed mobility and transfers to chair and to/from toilet. Pt ambulated with crutches 150ft with conditional indpendence. Pt ascended/descended 5 steps with crutches with CGA to SBA. Pt reports good support system and will have assistaance as needed going in/out of apartment. Pt tolerated evaluation and crutch training well and has met all goals. Anticipate discharge home with assistance as needed.  -     Row Name 08/04/20 6986          Physical Therapy Clinical Impression    Patient/Family Goals Statement (PT Clinical Impression)  home with assistance  -     Criteria for Skilled Interventions Met (PT Clinical Impression)  yes;treatment indicated  -     Rehab Potential (PT Clinical Summary)  good, to achieve stated therapy goals  -     Predicted Duration of Therapy (PT)  1 day  -     Row Name 08/04/20 1334          Vital Signs    Pre Systolic BP Rehab  142  -TAY     Pre Treatment Diastolic BP  90  -TAY     Post Systolic BP Rehab  124  -TAY     Post Treatment Diastolic BP  78  -TAY     Pretreatment Heart Rate (beats/min)  94  -TAY     Posttreatment Heart Rate (beats/min)  102  -     Pre SpO2 (%)  99  -TAY     O2 Delivery Pre Treatment  room air  -     Post SpO2 (%)  95  -TAY     O2  Delivery Post Treatment  room air  -TAY     Pre Patient Position  Sitting long sitting in bed  -TAY     Post Patient Position  Sitting  -TAY     Row Name 08/04/20 1849          Positioning and Restraints    Pre-Treatment Position  in bed  -TAY     Post Treatment Position  bed  -TAY     In Bed  call light within reach;RLE elevated  -       User Key  (r) = Recorded By, (t) = Taken By, (c) = Cosigned By    Initials Name Provider Type    Tammy Jackson PT Physical Therapist        Outcome Measures     Row Name 08/04/20 8710          How much help from another person do you currently need...    Turning from your back to your side while in flat bed without using bedrails?  4  -TAY     Moving from lying on back to sitting on the side of a flat bed without bedrails?  4  -TAY     Moving to and from a bed to a chair (including a wheelchair)?  4  -TAY     Standing up from a chair using your arms (e.g., wheelchair, bedside chair)?  4  -TAY     Climbing 3-5 steps with a railing?  3  -TAY     To walk in hospital room?  4  -ATY     AM-PAC 6 Clicks Score (PT)  23  -     Row Name 08/04/20 8369          Functional Assessment    Outcome Measure Options  AM-PAC 6 Clicks Basic Mobility (PT)  -       User Key  (r) = Recorded By, (t) = Taken By, (c) = Cosigned By    Initials Name Provider Type    Tammy Jackson PT Physical Therapist        Physical Therapy Education                 Title: PT OT SLP Therapies (Done)     Topic: Physical Therapy (Done)     Point: Mobility training (Done)     Description:   Instruct learner(s) on safety and technique for assisting patient out of bed, chair or wheelchair.  Instruct in the proper use of assistive devices, such as walker, crutches, cane or brace.              Patient Friendly Description:   It's important to get you on your feet again, but we need to do so in a way that is safe for you. Falling has serious consequences, and your personal safety is the most important thing of all.        When  it's time to get out of bed, one of us or a family member will sit next to you on the bed to give you support.     If your doctor or nurse tells you to use a walker, crutches, a cane, or a brace, be sure you use it every time you get out of bed, even if you think you don't need it.    Learning Progress Summary           Patient Acceptance, D,E, DU,VU by  at 8/4/2020 1553    Comment:  gait with crutches;home safety                   Point: Body mechanics (Done)     Description:   Instruct learner(s) on proper positioning and spine alignment for patient and/or caregiver during mobility tasks and/or exercises.              Learning Progress Summary           Patient Acceptance, D,E, DU,VU by  at 8/4/2020 1553    Comment:  gait with crutches;home safety                   Point: Precautions (Done)     Description:   Instruct learner(s) on prescribed precautions during mobility and gait tasks              Learning Progress Summary           Patient Acceptance, D,E, DU,VU by  at 8/4/2020 1553    Comment:  gait with crutches;home safety                               User Key     Initials Effective Dates Name Provider Type Discipline     04/03/18 -  Tammy Szymanski, PT Physical Therapist PT              PT Recommendation and Plan  Planned Therapy Interventions (PT Eval): bed mobility training, gait training, patient/family education, stair training, transfer training  Outcome Summary/Treatment Plan (PT)  Anticipated Equipment Needs at Discharge (PT): (pt has axillary crutches)  Anticipated Discharge Disposition (PT): home with assist  Plan of Care Reviewed With: patient  Outcome Summary: PT evaluation completed. Pt pleasant and agreeable to therapy. Reports recently had pain med. Pt independent with bed mobility and transfers to chair and to/from toilet. Pt ambulated with crutches 150ft with conditional indpendence. Pt ascended/descended 5 steps with crutches with CGA to SBA. Pt reports good support system and will  have assistaance as needed going in/out of apartment. Pt tolerated evaluation and crutch training well and has met all goals. Anticipate discharge home with assistance as needed.     Time Calculation:   PT Charges     Row Name 08/04/20 1555             Time Calculation    Start Time  1334  -TAY      Stop Time  1418  -TAY      Time Calculation (min)  44 min  -TAY      PT Received On  08/04/20  -TAY         Time Calculation- PT    Total Timed Code Minutes- PT  25 minute(s)  -        User Key  (r) = Recorded By, (t) = Taken By, (c) = Cosigned By    Initials Name Provider Type    TAY Tammy Szymanski, PT Physical Therapist        Therapy Charges for Today     Code Description Service Date Service Provider Modifiers Qty    08955233195  PT EVAL MOD COMPLEXITY 1 8/4/2020 Tammy Szymanski, PT GP 1    [id number] HC GAIT TRAINING EA 15 MIN 8/4/2020 Tammy Szymanski, PT GP 1    [id number]  PT THERAPEUTIC ACT EA 15 MIN 8/4/2020 Tammy Szymanski, PT GP 1          PT G-Codes  Outcome Measure Options: AM-PAC 6 Clicks Basic Mobility (PT)  AM-PAC 6 Clicks Score (PT): 23    Tammy Szymanski PT  8/4/2020

## 2020-08-04 NOTE — PLAN OF CARE
Problem: Patient Care Overview  Goal: Plan of Care Review  8/4/2020 1047 by Kasia Burr RN  Outcome: Ongoing (interventions implemented as appropriate)  Flowsheets  Taken 8/4/2020 1047  Progress: improving  Taken 8/4/2020 0822  Plan of Care Reviewed With: patient  Note:   No concerns or complaints noted at this time

## 2020-08-06 NOTE — ADDENDUM NOTE
Addendum  created 08/06/20 1553 by Arjun Mercado, CRNA    Diagnosis association updated, Intraprocedure Blocks edited, Sign clinical note

## 2020-08-11 ENCOUNTER — OFFICE VISIT (OUTPATIENT)
Dept: FAMILY MEDICINE CLINIC | Facility: CLINIC | Age: 20
End: 2020-08-11

## 2020-08-11 VITALS
OXYGEN SATURATION: 98 % | HEART RATE: 116 BPM | HEIGHT: 64 IN | WEIGHT: 158 LBS | DIASTOLIC BLOOD PRESSURE: 80 MMHG | SYSTOLIC BLOOD PRESSURE: 120 MMHG | BODY MASS INDEX: 26.98 KG/M2

## 2020-08-11 DIAGNOSIS — S93.324D LISFRANC DISLOCATION, RIGHT, SUBSEQUENT ENCOUNTER: Primary | ICD-10-CM

## 2020-08-11 PROCEDURE — 99213 OFFICE O/P EST LOW 20 MIN: CPT | Performed by: STUDENT IN AN ORGANIZED HEALTH CARE EDUCATION/TRAINING PROGRAM

## 2020-08-13 NOTE — PROGRESS NOTES
Family Medicine Residency  Grace Chan MD    Subjective:     Tiffany Pruitt is a 20 y.o. female who presents for establish care and follow-up of ORIF of right foot. She sustained a Lisfranc type fracture dislocation on 7/24. She reportedly was walking down her stairs when she misstepped causing the fracture. Dr. Mckeon performed ORIF on 8/3. She spent 1 night at the hospital for monitoring and had no post-op complications. She is to follow-up with ortho later this week. She has occasional pain and uses norco at most once a day. She denies numbness, tingling, fever, or chills. She has no chronic medical issues and does not take any other medication.     PHQ-9 Depression Screening  Little interest or pleasure in doing things? 0   Feeling down, depressed, or hopeless? 0   Trouble falling or staying asleep, or sleeping too much? 0   Feeling tired or having little energy? 1   Poor appetite or overeating? 1   Feeling bad about yourself - or that you are a failure or have let yourself or your family down? 0   Trouble concentrating on things, such as reading the newspaper or watching television? 1   Moving or speaking so slowly that other people could have noticed? Or the opposite - being so fidgety or restless that you have been moving around a lot more than usual? 0   Thoughts that you would be better off dead, or of hurting yourself in some way? 0   PHQ-9 Total Score 3   If you checked off any problems, how difficult have these problems made it for you to do your work, take care of things at home, or get along with other people? Not difficult at all       The following portions of the patient's history were reviewed and updated as appropriate: allergies, current medications, past family history, past medical history, past social history, past surgical history and problem list.    Past Medical Hx:  Past Medical History:   Diagnosis Date   • Allergic rhinitis    • Asthma    • Bronchitis    • Candidiasis of  urogenital site    • Encounter for screening for infections with a predominantly sexual mode of transmission    • Fever    • Hemangioma     Hemangioma   • Menorrhagia    • Miscarriage    • Oral contraceptive prescribed    • Other sex counseling    • Otitis media    • Upper respiratory infection    • Urticaria        Past Surgical Hx:  Past Surgical History:   Procedure Laterality Date   • DENTAL PROCEDURE     • INJECTION OF MEDICATION  07/21/2015    Depo Provera (medroxyprogesterone acetate) (2)     • ORIF FOOT FRACTURE Right 8/3/2020    Procedure: FOOT MID OPEN REDUCTION INTERNAL FIXATION;  Surgeon: Jon Mckeon MD;  Location: [facility];  Service: Orthopedics;  Laterality: Right;   • SKIN BIOPSY  2000    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 [facility]       Current Meds:    Current Outpatient Medications:   •  acetaminophen (TYLENOL) 325 MG tablet, Take 2 tablets by mouth Every 6 (Six) Hours As Needed for Mild Pain., Disp: 60 tablet, Rfl: 0  •  docusate sodium (COLACE) 100 MG capsule, Take 2 capsules by mouth 2 (Two) Times a Day., Disp: 30 capsule, Rfl: 1  •  HYDROcodone-acetaminophen (NORCO) 7.5-325 MG per tablet, Take 1 to 2 tablets by mouth 4 (Four) Times a Day As Needed for Moderate Pain for up to 10 days., Disp: 30 tablet, Rfl: 0  •  ondansetron (ZOFRAN) 4 MG tablet, Take 4 mg by mouth Every 8 (Eight) Hours As Needed for Nausea or Vomiting., Disp: , Rfl:     Allergies:  Allergies   Allergen Reactions   • Amoxicillin Hives   • Ceftin [Cefuroxime] Hives   • Ciprocinonide [Fluocinolone] Hives   • Penicillins Hives   • Sulfa Antibiotics Other (See Comments)     Pt does not know the reaction to this med       Family Hx:  Family History   Problem Relation Age of Onset   • Asthma Other    • Bipolar disorder Other    • Endometriosis Other    • Schizophrenia Other    •  "Cancer Other    • Lung disease Other    • Diabetes Other         Social History:  Social History     Socioeconomic History   • Marital status: Single     Spouse name: Not on file   • Number of children: Not on file   • Years of education: Not on file   • Highest education level: Not on file   Tobacco Use   • Smoking status: Never Smoker   • Smokeless tobacco: Never Used   Substance and Sexual Activity   • Alcohol use: Yes   • Drug use: No   • Sexual activity: Yes     Partners: Male     Birth control/protection: None       Review of Systems  Review of Systems   Constitutional: Negative for activity change, appetite change, fatigue and fever.   HENT: Negative for congestion, rhinorrhea, sinus pain and sore throat.    Eyes: Negative for pain, redness and visual disturbance.   Respiratory: Negative for cough, shortness of breath and wheezing.    Cardiovascular: Negative for chest pain, palpitations and leg swelling.   Gastrointestinal: Negative for abdominal pain, constipation, diarrhea, nausea and vomiting.   Genitourinary: Negative for dysuria and flank pain.   Musculoskeletal: Positive for arthralgias. Negative for back pain, joint swelling and myalgias.   Skin: Negative for color change, pallor and rash.   Neurological: Negative for dizziness, light-headedness and headaches.       Objective:     /80   Pulse 116   Ht 162.6 cm (64\")   Wt 71.7 kg (158 lb)   LMP 07/15/2020   SpO2 98%   BMI 27.12 kg/m²   Physical Exam   Constitutional: She is oriented to person, place, and time. Vital signs are normal. She appears well-developed and well-nourished. No distress.   HENT:   Head: Normocephalic and atraumatic.   Right Ear: Hearing normal.   Left Ear: Hearing normal.   Eyes: Conjunctivae, EOM and lids are normal.   Cardiovascular: Normal rate, regular rhythm and normal heart sounds.   No murmur heard.  Pulmonary/Chest: Effort normal and breath sounds normal. No respiratory distress. She has no wheezes. She has no " rales.   Abdominal: Soft. Normal appearance and bowel sounds are normal. She exhibits no distension. There is no tenderness.   Musculoskeletal: She exhibits tenderness. She exhibits no edema or deformity.        Right foot: There is tenderness.   Right foot and ankle wrapped in bandages. ROM limited by wrapping. Sensation intact. Distal movements in tact.   Neurological: She is alert and oriented to person, place, and time. She has normal strength. She is not disoriented.   Skin: Skin is warm, dry and intact. No rash noted. She is not diaphoretic. No erythema. No pallor.   Nursing note and vitals reviewed.       Assessment/Plan:     Tiffany was seen today for follow-up.    Diagnoses and all orders for this visit:    Lisfranc dislocation, right, subsequent encounter  -ORIF performed on 8/3 by Dr. Mckeon. Recovering appropriately. Follow up appointment scheduled for this week.   -Call with any new concerns.      · Rx changes: none  · Patient Education: see A/P  · Compliance at present is estimated to be good.      Follow-up:     Return in about 3 months (around 11/11/2020).    Preventative:  Health Maintenance   Topic Date Due   • ANNUAL PHYSICAL  01/01/2003   • HPV VACCINES (1 - Female 2-dose series) 01/01/2011   • INFLUENZA VACCINE  08/01/2020   • CHLAMYDIA SCREENING  02/11/2021   • TDAP/TD VACCINES (2 - Td) 04/06/2021   • HEPATITIS C SCREENING  Completed   • MENINGOCOCCAL VACCINE (Normal Risk)  Aged Out     Female Preventative: Exercises regularly  Recommended: HPV  Vaccine Counseling: N/A    Weight  -Class: Overweight: 25.0-29.9kg/m2   -Patient's Body mass index is 27.12 kg/m². BMI is above normal parameters. Recommendations include: exercise counseling and nutrition counseling.   eat more fruits and vegetables, decrease soda or juice intake, increase water intake and increase physical activity    Alcohol use:  reports that she drinks alcohol.  Nicotine status  reports that she has never smoked. She has never used  smokeless tobacco.    Goals    None         RISK SCORE: 2      Grace Chan M.D. PGY2  [facility] Medicine Residency  [address] [place] [address]  Office: [phone]  This document has been electronically signed by Grace Chan MD on August 13, 2020 17:12      Dictated utilizing Dragon dictation.

## 2020-08-14 NOTE — PROGRESS NOTES
I have reviewed the notes, assessments, and/or procedures performed by Grace Chan MD, I concur with her/his documentation of Tiffany Pruitt.           This document has been electronically signed by Leonie Guajardo MD on August 14, 2020 14:01

## 2020-08-18 ENCOUNTER — OFFICE VISIT (OUTPATIENT)
Dept: ORTHOPEDIC SURGERY | Facility: CLINIC | Age: 20
End: 2020-08-18

## 2020-08-18 VITALS — WEIGHT: 158 LBS | HEIGHT: 64 IN | BODY MASS INDEX: 26.98 KG/M2 | HEART RATE: 79 BPM | OXYGEN SATURATION: 98 %

## 2020-08-18 DIAGNOSIS — S93.324D CLOSED DISLOCATION OF TARSOMETATARSAL JOINT OF RIGHT FOOT, SUBSEQUENT ENCOUNTER: ICD-10-CM

## 2020-08-18 DIAGNOSIS — M79.671 RIGHT FOOT PAIN: Primary | ICD-10-CM

## 2020-08-18 PROCEDURE — 99024 POSTOP FOLLOW-UP VISIT: CPT | Performed by: ORTHOPAEDIC SURGERY

## 2020-08-18 PROCEDURE — 29405 APPL SHORT LEG CAST: CPT | Performed by: ORTHOPAEDIC SURGERY

## 2020-08-18 RX ORDER — HYDROCODONE BITARTRATE AND ACETAMINOPHEN 7.5; 325 MG/1; MG/1
TABLET ORAL
Qty: 25 TABLET | Refills: 0 | Status: SHIPPED | OUTPATIENT
Start: 2020-08-18 | End: 2021-01-26

## 2020-08-18 NOTE — PROGRESS NOTES
Tiffany Pruitt is a 20 y.o. female is s/p       Chief Complaint   Patient presents with   • Right Foot - Post-op       HISTORY OF PRESENT ILLNESS:   08/03/20 (15d) Jon Mckeon MD     Foot Mid Open Reduction Internal Fixation - Right     Patient being seen for right foot post-op. Date of surgery 8/3/2020.     Ms. Pruitt is now a little over 2 weeks since uncomplicated surgery.  Her pain is steadily improving.       Allergies   Allergen Reactions   • Amoxicillin Hives   • Ceftin [Cefuroxime] Hives   • Ciprocinonide [Fluocinolone] Hives   • Penicillins Hives   • Sulfa Antibiotics Other (See Comments)     Pt does not know the reaction to this med         Current Outpatient Medications:   •  acetaminophen (TYLENOL) 325 MG tablet, Take 2 tablets by mouth Every 6 (Six) Hours As Needed for Mild Pain., Disp: 60 tablet, Rfl: 0  •  docusate sodium (COLACE) 100 MG capsule, Take 2 capsules by mouth 2 (Two) Times a Day., Disp: 30 capsule, Rfl: 1  •  ondansetron (ZOFRAN) 4 MG tablet, Take 4 mg by mouth Every 8 (Eight) Hours As Needed for Nausea or Vomiting., Disp: , Rfl:   •  HYDROcodone-acetaminophen (NORCO) 7.5-325 MG per tablet, 1 by mouth at bedtime as needed for pain, Disp: 25 tablet, Rfl: 0    No fevers or chills.  No nausea or vomiting.      PHYSICAL EXAMINATION:       Tiffany Pruitt is a 20 y.o. female      She is alert and in no apparent distress.  GAIT:     []  Normal  [x]  Antalgic    Assistive device: []  None  []  Walker     [x]  Crutches  []  Cane     []  Wheelchair  []  Stretcher    Ortho Exam her splints are solid.  Toes are warm.  Her splints were removed as were her dressings.  Her incisions were clean and dry with no evidence of infection.  The skin about the exposed pins was clean and dry.  Sensory exam is intact to soft touch.    A bulky soft dressing was applied followed by a short leg cast fabricated of fiberglass casting material.  We were not quite able to bring her ankle up to neutral  because of her discomfort.      Xr Ankle 3+ View Right    Result Date: 7/24/2020  Narrative: THREE-VIEW RIGHT ANKLE, THREE-VIEW RIGHT FOOT CLINICAL HISTORY: fall, pain FINDINGS RIGHT ANKLE: 3 views of the right ankle were obtained. No acute fracture or dislocation identified. Soft tissues appear unremarkable..     Impression: 1. No acute osseous finding.. FINDINGS RIGHT FOOT: 3 views of the right foot were obtained. There is a Lisfranc type fracture dislocation along the tarsal-metatarsal joint. There are small avulsed fragments along the lateral aspect of the cuboid which are minimally displaced. On the lateral view, there are some small fracture fragments dorsal to the cuneiforms favored to originate from the base of the second metatarsal. The second through fourth metatarsal bases are subluxed dorsally and the third through fifth metatarsal bases are dislocated laterally. There is generalized soft tissue swelling. CT may be helpful to better assess the articular relationships as well as evaluate for additional, occult fractures which are suspected. IMPRESSION: Lisfranc type fracture dislocation about the tarsal-metatarsal joint as discussed Electronically signed by:  Alejandro Freed MD  7/24/2020 10:59 PM CDT Workstation: [id number]    Xr Foot 2 View Right    Result Date: 7/25/2020  Narrative: EXAM DESCRIPTION: XR FOOT 2 VW RIGHT CLINICAL HISTORY: 20 years Female S/P reduction TECHNIQUE: Two views of the right foot. COMPARISON: 7/24/2020. FINDINGS: There has been partial closed reduction of the previously seen acute Lisfranc fracture-dislocation. There remains mild widening of the Lisfranc joint and slight lateral subluxation of the second through fifth metatarsals relative to the tarsal bones. Reduction interval reduction of the previously seen dorsal displacement. There is an acute osseous avulsion from the medial base of the second metatarsal. There are suspected small fracture fragments likely arising from the  cuboid, lateral cuneiform, and middle and/or medial cuneiforms. No aggressive osseous lesion. No no radiodense foreign body.     Impression: Interval partial closed reduction of the previously seen acute Lisfranc fracture-dislocation. Electronically signed by:  Emiliana Freeman MD  7/25/2020 1:17 AM CDT Workstation: [id number]    Xr Foot 3+ View Right    Result Date: 8/3/2020  Narrative: EXAM: XR FOOT 3 OR MORE VIEWS COMPARISONS: Radiograph 7/25/2020 FINDINGS: Patient status post right Lisfranc fracture injury repair with screw and plate fixation of the first, second, and third tarsometatarsal joint spaces. There are percutaneous pinning through the fourth and fifth tarsometatarsal joint spaces. Hardware appears intact. Alignment appears appropriate. No acute fracture or dislocation. Soft tissue edema seen about the right foot. Overlying casting material limits fine detail.     Impression: Patient status post right Lisfranc fracture injury repair with intact hardware and appropriate alignment. Electronically signed by:  Radha Conway MD  8/3/2020 2:35 PM CDT Workstation: [id number]    Xr Foot 3+ View Right    Result Date: 7/24/2020  Narrative: THREE-VIEW RIGHT ANKLE, THREE-VIEW RIGHT FOOT CLINICAL HISTORY: fall, pain FINDINGS RIGHT ANKLE: 3 views of the right ankle were obtained. No acute fracture or dislocation identified. Soft tissues appear unremarkable..     Impression: 1. No acute osseous finding.. FINDINGS RIGHT FOOT: 3 views of the right foot were obtained. There is a Lisfranc type fracture dislocation along the tarsal-metatarsal joint. There are small avulsed fragments along the lateral aspect of the cuboid which are minimally displaced. On the lateral view, there are some small fracture fragments dorsal to the cuneiforms favored to originate from the base of the second metatarsal. The second through fourth metatarsal bases are subluxed dorsally and the third through fifth metatarsal bases are dislocated  laterally. There is generalized soft tissue swelling. CT may be helpful to better assess the articular relationships as well as evaluate for additional, occult fractures which are suspected. IMPRESSION: Lisfranc type fracture dislocation about the tarsal-metatarsal joint as discussed Electronically signed by:  Alejandro Freed MD  7/24/2020 10:59 PM CDT Workstation: [id number]    Ct Lower Extremity Right Without Contrast    Result Date: 7/25/2020  Narrative: HISTORY:  foot fracture, S93.324A Dislocation of tarsometatarsal joint of right foot, initial encounter PROCEDURE: CT LOWER EXTREMITY WITHOUT IV CONTRAST TECHNIQUE: Noncontrast axial, coronal, and sagittal images of the right foot were obtained.  This exam was performed according to our departmental dose-optimization program, which includes automated exposure control, adjustment of the mA and/or kV according to patient size and/or use of iterative reconstruction technique. COMPARISON:  Plain films from earlier today INTERPRETATION: Examination is trace mildly displaced fractures of the second and third metatarsal bases, intermediate and lateral cuneiforms, and cuboid.  Mild lateral subluxation of the second through fifth metatarsals is also noted.  There is normal alignment of the first TMT joint.  There is generalized soft tissue swelling.  Posterior splint is in place.     Impression: Lisfranc fracture dislocation as above. Electronically signed by:  Earnest Jernigan MD  7/25/2020 2:03 AM CDT Workstation: [id number]          ASSESSMENT: Satisfactory progress following open reduction tarsometatarsal joints right foot.  She was again instructed in symptomatic care.    Return here in 2 weeks for x-rays of the left foot 3 views out of her cast.    She was given a prescription for Norco 7.5 mg 25 in number to take primarily at nighttime to help her sleep.      Diagnoses and all orders for this visit:    Right foot pain    Closed dislocation of tarsometatarsal joint of  right foot, subsequent encounter  -     HYDROcodone-acetaminophen (NORCO) 7.5-325 MG per tablet; 1 by mouth at bedtime as needed for pain          PLAN    Patient's Body mass index is 27.12 kg/m². BMI is above normal parameters. Recommendations include: exercise counseling and nutrition counseling.      Return in about 2 weeks (around 9/1/2020).    Jon Mckeon MD

## 2020-08-28 DIAGNOSIS — S93.324D CLOSED DISLOCATION OF TARSOMETATARSAL JOINT OF RIGHT FOOT, SUBSEQUENT ENCOUNTER: Primary | ICD-10-CM

## 2020-09-01 ENCOUNTER — OFFICE VISIT (OUTPATIENT)
Dept: ORTHOPEDIC SURGERY | Facility: CLINIC | Age: 20
End: 2020-09-01

## 2020-09-01 VITALS — BODY MASS INDEX: 26.85 KG/M2 | WEIGHT: 157.3 LBS | HEART RATE: 95 BPM | OXYGEN SATURATION: 98 % | HEIGHT: 64 IN

## 2020-09-01 DIAGNOSIS — S93.324D CLOSED DISLOCATION OF TARSOMETATARSAL JOINT OF RIGHT FOOT, SUBSEQUENT ENCOUNTER: Primary | ICD-10-CM

## 2020-09-01 DIAGNOSIS — M79.671 RIGHT FOOT PAIN: ICD-10-CM

## 2020-09-01 PROCEDURE — 99024 POSTOP FOLLOW-UP VISIT: CPT | Performed by: ORTHOPAEDIC SURGERY

## 2020-09-01 NOTE — PROGRESS NOTES
Tiffany Pruitt is a 20 y.o. female is s/p       Chief Complaint   Patient presents with   • Right Foot - Post-op       HISTORY OF PRESENT ILLNESS:   08/03/20 (29d) Jon Mckeon MD     Foot Mid Open Reduction Internal Fixation - Right     Patient being seen for right foot post-op. X-rays done today.     Ms. Pruitt is now 1 month since surgery.  Her pain has continued to improve.       Allergies   Allergen Reactions   • Amoxicillin Hives   • Ceftin [Cefuroxime] Hives   • Ciprocinonide [Fluocinolone] Hives   • Penicillins Hives   • Sulfa Antibiotics Other (See Comments)     Pt does not know the reaction to this med         Current Outpatient Medications:   •  acetaminophen (TYLENOL) 325 MG tablet, Take 2 tablets by mouth Every 6 (Six) Hours As Needed for Mild Pain., Disp: 60 tablet, Rfl: 0  •  docusate sodium (COLACE) 100 MG capsule, Take 2 capsules by mouth 2 (Two) Times a Day., Disp: 30 capsule, Rfl: 1  •  HYDROcodone-acetaminophen (NORCO) 7.5-325 MG per tablet, 1 by mouth at bedtime as needed for pain, Disp: 25 tablet, Rfl: 0  •  ondansetron (ZOFRAN) 4 MG tablet, Take 4 mg by mouth Every 8 (Eight) Hours As Needed for Nausea or Vomiting., Disp: , Rfl:     No fevers or chills.  No nausea or vomiting.      PHYSICAL EXAMINATION:       Tiffany Pruitt is a 20 y.o. female    Patient is awake and alert, answers questions appropriately and is in no apparent distress.    GAIT:     []  Normal  []  Antalgic    Assistive device: []  None  []  Walker     [x]  Crutches  []  Cane     []  Wheelchair  []  Stretcher    Ortho Exam her cast was removed.  Her incisions are well-healed.  There was diffuse hypaesthesia soft touch over the dorsum of the forefoot.  There was no evidence of infection.  Her pin sites were clean.    I recommended removal of her K wires.  She was agreeable with this.    The K wire was removed with little difficulty.  Soft dressing was applied.  She tolerated this well.    Xr Foot 3+ View  Right    Result Date: 9/1/2020  Ordering Provider:  Jon Mckeon MD Ordering Diagnosis/Indication:  Closed dislocation of tarsometatarsal joint of right foot, subsequent encounter Procedure:  XR FOOT 3+ VW RIGHT Exam Date:  9/1/20 COMPARISON: Exam of the foot dated 3 August 2020      Radiographs of the right foot PA lateral and oblique views done today show the tarsometatarsal joints are in satisfactory position.  There is a plate and screws stabilizing the first second and third joints and smooth K wire stabilizing the fourth and fifth. Jon Mckeon MD 9/1/20        Xr Foot 3+ View Right    Result Date: 8/3/2020  Narrative: EXAM: XR FOOT 3 OR MORE VIEWS COMPARISONS: Radiograph 7/25/2020 FINDINGS: Patient status post right Lisfranc fracture injury repair with screw and plate fixation of the first, second, and third tarsometatarsal joint spaces. There are percutaneous pinning through the fourth and fifth tarsometatarsal joint spaces. Hardware appears intact. Alignment appears appropriate. No acute fracture or dislocation. Soft tissue edema seen about the right foot. Overlying casting material limits fine detail.     Impression: Patient status post right Lisfranc fracture injury repair with intact hardware and appropriate alignment. Electronically signed by:  Radha Conway MD  8/3/2020 2:35 PM CDT Workstation: [id number]          ASSESSMENT: This factory progress following open reduction Lisfranc injury.  She was given an Ace wrap for soft support and encouraged in range of motion exercises for the ankle.  She was cautioned against any stress across the tarsometatarsal joints.  She was given a boot orthosis to wear when walking.    Return here in 1 month for x-rays of the foot.    Diagnoses and all orders for this visit:    Closed dislocation of tarsometatarsal joint of right foot, subsequent encounter    Right foot pain          PLAN    Patient's Body mass index is 27 kg/m². BMI is within normal  parameters. No follow-up required..      No follow-ups on file.    Linnea Sandhu MA

## 2020-09-28 DIAGNOSIS — S93.324D CLOSED DISLOCATION OF TARSOMETATARSAL JOINT OF RIGHT FOOT, SUBSEQUENT ENCOUNTER: Primary | ICD-10-CM

## 2020-09-29 ENCOUNTER — OFFICE VISIT (OUTPATIENT)
Dept: ORTHOPEDIC SURGERY | Facility: CLINIC | Age: 20
End: 2020-09-29

## 2020-09-29 VITALS — WEIGHT: 161 LBS | HEIGHT: 64 IN | OXYGEN SATURATION: 98 % | HEART RATE: 82 BPM | BODY MASS INDEX: 27.49 KG/M2

## 2020-09-29 DIAGNOSIS — S93.324D CLOSED DISLOCATION OF TARSOMETATARSAL JOINT OF RIGHT FOOT, SUBSEQUENT ENCOUNTER: Primary | ICD-10-CM

## 2020-09-29 PROCEDURE — 99024 POSTOP FOLLOW-UP VISIT: CPT | Performed by: ORTHOPAEDIC SURGERY

## 2020-10-26 DIAGNOSIS — S93.324A LISFRANC'S DISLOCATION, RIGHT, INITIAL ENCOUNTER: Primary | ICD-10-CM

## 2020-10-27 ENCOUNTER — OFFICE VISIT (OUTPATIENT)
Dept: ORTHOPEDIC SURGERY | Facility: CLINIC | Age: 20
End: 2020-10-27

## 2020-10-27 VITALS — BODY MASS INDEX: 27.49 KG/M2 | OXYGEN SATURATION: 98 % | HEIGHT: 64 IN | WEIGHT: 161 LBS | HEART RATE: 76 BPM

## 2020-10-27 DIAGNOSIS — S93.324D LISFRANC DISLOCATION, RIGHT, SUBSEQUENT ENCOUNTER: Primary | ICD-10-CM

## 2020-10-27 PROCEDURE — 99024 POSTOP FOLLOW-UP VISIT: CPT | Performed by: ORTHOPAEDIC SURGERY

## 2020-10-27 NOTE — PROGRESS NOTES
Tiffany Pruitt is a 20 y.o. female is s/p     08/03/20 (85d) Jon Mckeon MD    Foot Mid Open Reduction Internal Fixation - Right          Chief Complaint   Patient presents with   • Right Foot - Follow-up     HISTORY OF PRESENT ILLNESS:follow up with x-rays done today in office.  Pain scale today 0/10    Ms. Pruitt is now 3 months since surgery.  She is having little to no pain.       Allergies   Allergen Reactions   • Amoxicillin Hives   • Ceftin [Cefuroxime] Hives   • Ciprocinonide [Fluocinolone] Hives   • Penicillins Hives   • Sulfa Antibiotics Other (See Comments)     Pt does not know the reaction to this med         Current Outpatient Medications:   •  acetaminophen (TYLENOL) 325 MG tablet, Take 2 tablets by mouth Every 6 (Six) Hours As Needed for Mild Pain., Disp: 60 tablet, Rfl: 0  •  docusate sodium (COLACE) 100 MG capsule, Take 2 capsules by mouth 2 (Two) Times a Day., Disp: 30 capsule, Rfl: 1  •  HYDROcodone-acetaminophen (NORCO) 7.5-325 MG per tablet, 1 by mouth at bedtime as needed for pain, Disp: 25 tablet, Rfl: 0  •  ondansetron (ZOFRAN) 4 MG tablet, Take 4 mg by mouth Every 8 (Eight) Hours As Needed for Nausea or Vomiting., Disp: , Rfl:     No fevers or chills.  No nausea or vomiting.      PHYSICAL EXAMINATION:       Tiffany Pruitt is a 20 y.o. female    She is alert and in no apparent distress.    GAIT:     []  Normal  []  Antalgic    Assistive device: []  None  []  Walker     [x]  Crutches  []  Cane     []  Wheelchair  []  Stretcher    Ortho Exam her wounds are healed with no evidence of infection.  There is firm edema in the foot to a mild degree.  Passive extension of the ankle is to neutral.    Xr Foot 3+ View Right    Result Date: 10/27/2020  Ordering Provider:  Jon Mckeon MD Ordering Diagnosis/Indication:  Lisfranc's dislocation, right, initial encounter Procedure:  XR FOOT 3+ VW RIGHT Exam Date:  10/27/20 COMPARISON: Exam of the foot dated 29 September 2020       "Radiographs of the right foot PA lateral and oblique views done today show hardware stabilizing the first second and third tarsometatarsal joints.  Position of the joints and hardware remains unchanged.  The fourth and fifth tarsometatarsal joints are reduced.  There is moderate diffuse osteoporosis. Jon Mckeon MD 10/27/20      Vitals:    10/27/20 1311 10/27/20 1316   Pulse:  76   SpO2:  98%   Weight: 73 kg (161 lb)    Height: 162.6 cm (64\")      Xr Foot 3+ View Right    Result Date: 10/27/2020  Narrative: Ordering Provider:  Jon Mckeon MD Ordering Diagnosis/Indication:  Lisfranc's dislocation, right, initial encounter Procedure:  XR FOOT 3+ VW RIGHT Exam Date:  10/27/20 COMPARISON: Exam of the foot dated 29 September 2020     Impression:  Radiographs of the right foot PA lateral and oblique views done today show hardware stabilizing the first second and third tarsometatarsal joints.  Position of the joints and hardware remains unchanged.  The fourth and fifth tarsometatarsal joints are reduced.  There is moderate diffuse osteoporosis. Jon Mckeon MD 10/27/20    Xr Foot 3+ View Right    Result Date: 9/29/2020  Narrative: Ordering Provider:  Jon Mckeon MD Ordering Diagnosis/Indication:  Closed dislocation of tarsometatarsal joint of right foot, subsequent encounter Procedure:  XR FOOT 3+ VW RIGHT Exam Date:  9/29/20 COMPARISON: Exam of the foot dated 1 September 2020     Impression:  Radiographs of the right foot PA lateral and oblique views done today show the tarsometatarsal joints are reduced.  There is no hardware remains satisfactory.  There is a suggestion of fusion across the third tarsometatarsal joint.  There is moderate osteoporosis diffusely in the foot. Jon Mckeon MD 9/29/20          ASSESSMENT: Healing Lisfranc injury right foot.  She may begin weightbearing as tolerated with her boot on.  She was instructed in more aggressive range of motion exercises for " the ankle.  As she becomes able to bear full weight with the boot she may gradually wean herself from the boot.  I recommended use of a clog to try to minimize stress across the tarsometatarsal joints.  Potential benefits of formal therapy were discussed but she declined this.    Return here in 1 month for clinical exam.        Diagnoses and all orders for this visit:    Lisfranc dislocation, right, subsequent encounter          PLAN    Return in about 4 weeks (around 11/24/2020).    Jon Mckeon MD

## 2020-11-24 ENCOUNTER — OFFICE VISIT (OUTPATIENT)
Dept: ORTHOPEDIC SURGERY | Facility: CLINIC | Age: 20
End: 2020-11-24

## 2020-11-24 VITALS — WEIGHT: 170 LBS | OXYGEN SATURATION: 97 % | BODY MASS INDEX: 29.02 KG/M2 | HEART RATE: 94 BPM | HEIGHT: 64 IN

## 2020-11-24 DIAGNOSIS — S93.324D LISFRANC DISLOCATION, RIGHT, SUBSEQUENT ENCOUNTER: Primary | ICD-10-CM

## 2020-11-24 PROCEDURE — 99213 OFFICE O/P EST LOW 20 MIN: CPT | Performed by: ORTHOPAEDIC SURGERY

## 2020-11-24 NOTE — PROGRESS NOTES
"Tiffany Pruitt is a 20 y.o. female returns for     Chief Complaint   Patient presents with   • Right Foot - Follow-up       HISTORY OF PRESENT ILLNESS:S/P   Ms. Pruitt is now 4 months since surgery.  She is having little to no pain and has been full weightbearing.  She tried wearing regular shoes only once and said she had a feeling of stiffness of her ankle so she returned to wearing her boot.       08/03/20 (113d) Jon Mckeon MD    Foot Mid Open Reduction Internal Fixation - Right            CONCURRENT MEDICAL HISTORY:    The following portions of the patient's history were reviewed and updated as appropriate: allergies, current medications, past family history, past medical history, past social history, past surgical history and problem list.         PHYSICAL EXAMINATION:       Pulse 94   Ht 162.6 cm (64\")   Wt 77.1 kg (170 lb)   SpO2 97%   BMI 29.18 kg/m²     Physical Exam she is alert and in no apparent distress.    GAIT:     []  Normal  []  Antalgic    Assistive device: []  None  []  Walker     []  Crutches  []  Cane     []  Wheelchair  []  Stretcher    Ortho Exam there is no pain on stressing of the tarsometatarsal joints.  Passive extension of the ankle is smooth but painfully restricted to about 5 degrees.      Xr Foot 3+ View Right    Result Date: 10/27/2020  Narrative: Ordering Provider:  Jon Mckeon MD Ordering Diagnosis/Indication:  Lisfranc's dislocation, right, initial encounter Procedure:  XR FOOT 3+ VW RIGHT Exam Date:  10/27/20 COMPARISON: Exam of the foot dated 29 September 2020     Impression:  Radiographs of the right foot PA lateral and oblique views done today show hardware stabilizing the first second and third tarsometatarsal joints.  Position of the joints and hardware remains unchanged.  The fourth and fifth tarsometatarsal joints are reduced.  There is moderate diffuse osteoporosis. Jon Mckeon MD 10/27/20            ASSESSMENT: Healing Lisfranc injury " right foot.  Think her ankle will mobilize fairly quickly when she starts walking without her brace.  She was again encouraged in her home exercise.  I requested follow-up here in 2 months for x-rays of the foot.    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  It sounds like this work with primarily sedentary nature.  She is eager to return to work.  I think this is reasonable.    Diagnoses and all orders for this visit:    Lisfranc dislocation, right, subsequent encounter          PLAN    Return in about 2 months (around 1/24/2021).    Jon Mckeon MD

## 2021-01-25 DIAGNOSIS — S93.324D LISFRANC DISLOCATION, RIGHT, SUBSEQUENT ENCOUNTER: Primary | ICD-10-CM

## 2021-01-26 ENCOUNTER — OFFICE VISIT (OUTPATIENT)
Dept: ORTHOPEDIC SURGERY | Facility: CLINIC | Age: 21
End: 2021-01-26

## 2021-01-26 VITALS — BODY MASS INDEX: 28 KG/M2 | WEIGHT: 164 LBS | OXYGEN SATURATION: 98 % | HEIGHT: 64 IN | HEART RATE: 99 BPM

## 2021-01-26 DIAGNOSIS — S93.324D LISFRANC DISLOCATION, RIGHT, SUBSEQUENT ENCOUNTER: Primary | ICD-10-CM

## 2021-01-26 DIAGNOSIS — S93.324D CLOSED DISLOCATION OF TARSOMETATARSAL JOINT OF RIGHT FOOT, SUBSEQUENT ENCOUNTER: ICD-10-CM

## 2021-01-26 DIAGNOSIS — M79.671 RIGHT FOOT PAIN: ICD-10-CM

## 2021-01-26 PROCEDURE — 99213 OFFICE O/P EST LOW 20 MIN: CPT | Performed by: ORTHOPAEDIC SURGERY

## 2021-01-26 RX ORDER — IBUPROFEN 200 MG
200 TABLET ORAL EVERY 6 HOURS PRN
COMMUNITY

## 2021-01-26 NOTE — PROGRESS NOTES
"Tiffany Pruitt is a 21 y.o. female returns for     Chief Complaint   Patient presents with   • Right Foot - Follow-up       HISTORY OF PRESENT ILLNESS:    08/03/20 (6m) Jon Mckeon MD    Foot Mid Open Reduction Internal Fixation - Right     Patient being seen for right foot follow up. Reports stiffness and aching during cold weather. Reports returned to work with no issues.     Ms. Pruitt is now 6 months since surgery.  She has returned to work and is having occasional aching in the foot and ankle.       CONCURRENT MEDICAL HISTORY:    The following portions of the patient's history were reviewed and updated as appropriate: allergies, current medications, past family history, past medical history, past social history, past surgical history and problem list.         PHYSICAL EXAMINATION:       Pulse 99   Ht 162.6 cm (64\")   Wt 74.4 kg (164 lb)   SpO2 98%   BMI 28.15 kg/m²     Physical Exam she is alert and in no apparent distress.    GAIT:     [x]  Normal  []  Antalgic    Assistive device: [x]  None  []  Walker     []  Crutches  []  Cane     []  Wheelchair  []  Stretcher    Ortho Exam he walks with a normal gait.  Ankle motion is smooth and produces no apparent pain.  Extension of the ankle is full.  There is no pain on stressing of the tarsometatarsal joints.    Xr Foot 3+ View Right    Result Date: 1/26/2021  Ordering Provider:  Jon Mckeon MD Ordering Diagnosis/Indication:  Lisfranc dislocation, right, subsequent encounter Procedure:  XR FOOT 3+ VW RIGHT Exam Date:  1/26/21 COMPARISON: Exam of the foot dated 27 October 2020      Radiographs of the right foot PA lateral and oblique views done today show stable position of hardware stabilizing the first second and third tarsometatarsal joints.  There is moderate osteoporosis diffusely. Jon Mckeon MD 1/26/21      Xr Foot 3+ View Right    Result Date: 1/26/2021  Narrative: Ordering Provider:  Jon Mckeon MD Ordering " Diagnosis/Indication:  Lisfranc dislocation, right, subsequent encounter Procedure:  XR FOOT 3+ VW RIGHT Exam Date:  1/26/21 COMPARISON: Exam of the foot dated 27 October 2020     Impression:  Radiographs of the right foot PA lateral and oblique views done today show stable position of hardware stabilizing the first second and third tarsometatarsal joints.  There is moderate osteoporosis diffusely. Jon Mckeon MD 1/26/21            ASSESSMENT: Healed Lisfranc injury right foot.    She may continue activities as tolerated.  Anticipated benefits of hardware removal were discussed.  SHe appeared to understand and think she would like to have this done in March some time.  Postoperative immobilization and rehabilitation were discussed.    Return here in 2 months for clinical exam and possible scheduling of surgery.    Diagnoses and all orders for this visit:    Lisfranc dislocation, right, subsequent encounter    Closed dislocation of tarsometatarsal joint of right foot, subsequent encounter    Right foot pain    Other orders  -     ibuprofen (ADVIL,MOTRIN) 200 MG tablet; Take 200 mg by mouth Every 6 (Six) Hours As Needed for Mild Pain .          PLAN    Patient's Body mass index is 28.15 kg/m². BMI is above normal parameters. Recommendations include: exercise counseling, nutrition counseling and referral to primary care.      No follow-ups on file.    Jon Mckeon MD

## 2021-02-01 ENCOUNTER — TELEPHONE (OUTPATIENT)
Dept: ORTHOPEDIC SURGERY | Facility: CLINIC | Age: 21
End: 2021-02-01

## 2021-02-01 NOTE — TELEPHONE ENCOUNTER
DR PALOMO.  PATIENT CALLED BACK STATING SHE WOULD LIKE HAVE SURGERY ON Tuesday, 03/16/2021 IF POSSIBLE.

## 2021-02-02 ENCOUNTER — PREP FOR SURGERY (OUTPATIENT)
Dept: OTHER | Facility: HOSPITAL | Age: 21
End: 2021-02-02

## 2021-02-02 DIAGNOSIS — S93.324D LISFRANC DISLOCATION, RIGHT, SUBSEQUENT ENCOUNTER: Primary | ICD-10-CM

## 2021-02-02 PROBLEM — S92.909A: Status: ACTIVE | Noted: 2021-02-02

## 2021-02-02 RX ORDER — CLINDAMYCIN PHOSPHATE 600 MG/50ML
600 INJECTION INTRAVENOUS ONCE
Status: CANCELLED | OUTPATIENT
Start: 2021-05-25 | End: 2021-02-02

## 2021-02-02 RX ORDER — ACETAMINOPHEN 500 MG
1000 TABLET ORAL ONCE
Status: CANCELLED | OUTPATIENT
Start: 2021-05-25 | End: 2021-02-02

## 2021-02-02 RX ORDER — CHLORHEXIDINE GLUCONATE 4 G/100ML
SOLUTION TOPICAL DAILY
Qty: 236 ML | Refills: 0 | Status: SHIPPED | OUTPATIENT
Start: 2021-02-02 | End: 2021-05-21

## 2021-02-04 ENCOUNTER — TELEPHONE (OUTPATIENT)
Dept: ORTHOPEDIC SURGERY | Facility: CLINIC | Age: 21
End: 2021-02-04

## 2021-04-20 ENCOUNTER — PREP FOR SURGERY (OUTPATIENT)
Dept: OTHER | Facility: HOSPITAL | Age: 21
End: 2021-04-20

## 2021-04-20 ENCOUNTER — OFFICE VISIT (OUTPATIENT)
Dept: ORTHOPEDIC SURGERY | Facility: CLINIC | Age: 21
End: 2021-04-20

## 2021-04-20 VITALS
HEIGHT: 64 IN | HEART RATE: 82 BPM | BODY MASS INDEX: 26.92 KG/M2 | DIASTOLIC BLOOD PRESSURE: 87 MMHG | WEIGHT: 157.7 LBS | RESPIRATION RATE: 18 BRPM | OXYGEN SATURATION: 98 % | SYSTOLIC BLOOD PRESSURE: 124 MMHG | TEMPERATURE: 98.4 F

## 2021-04-20 DIAGNOSIS — S93.324D LISFRANC DISLOCATION, RIGHT, SUBSEQUENT ENCOUNTER: Primary | ICD-10-CM

## 2021-04-20 DIAGNOSIS — M79.671 RIGHT FOOT PAIN: ICD-10-CM

## 2021-04-20 PROCEDURE — 99213 OFFICE O/P EST LOW 20 MIN: CPT | Performed by: ORTHOPAEDIC SURGERY

## 2021-04-20 NOTE — PROGRESS NOTES
"Tiffany Pruitt is a 21 y.o. female returns for     Chief Complaint   Patient presents with   • Right Foot - Follow-up       HISTORY OF PRESENT ILLNESS:  Patient in for follow up on right foot   She states, pain is rare in her foot, but she does have discomfort.     Ms. Pruitt had her injury in July 2020.  She has had intermittent aching over the dorsum of the foot recently.    Past medical history is unchanged from previous notes.  Since I last saw her she had hysteroscopy and laparoscopy with no anesthetic complication.  She has used both Percocet and hydrocodone for pain but both have caused nausea requiring Zofran.     CONCURRENT MEDICAL HISTORY:    The following portions of the patient's history were reviewed and updated as appropriate: allergies, current medications, past family history, past medical history, past social history, past surgical history and problem list.         PHYSICAL EXAMINATION:       /87   Pulse 82   Temp 98.4 °F (36.9 °C)   Resp 18   Ht 162.6 cm (64\")   Wt 71.5 kg (157 lb 11.2 oz)   SpO2 98%   BMI 27.07 kg/m²     Physical Exam she is alert healthy appearing and in no apparent distress.  She responds appropriately to questions and commands.    HEENT exam showed extraocular movements are intact.  Oropharynx shows teeth are in good repair.    Lungs are clear to auscultation.    Cardiac exam shows a regular rhythm normal rate no murmur.    The abdomen is soft and nontender with active bowel sounds.  Genitourinary and rectal exams were not done.    GAIT:     [x]  Normal  []  Antalgic    Assistive device: [x]  None  []  Walker     []  Crutches  []  Cane     []  Wheelchair  []  Stretcher    Ortho Exam there are well-healed surgical scars over the dorsum of the right foot.  There is hypaesthesia to soft touch over the dorsum of the foot between the 2 incisions.  There is no evidence of infection.  Posterior tibial pulses strong.  Ankle motion is full smooth and painless.    XR " Foot 3+ View Right [[id number]] (Order [id number])  Order  Status: Final result   Appointment Information    PACS Images     Radiology Images   Study Result    Narrative & Impression   Ordering Provider:  Jon Mckeon MD  Ordering Diagnosis/Indication:  Lisfranc dislocation, right, subsequent encounter     Procedure:  XR FOOT 3+ VW RIGHT  Exam Date:  1/26/21     COMPARISON: Exam of the foot dated 27 October 2020     IMPRESSION: Radiographs of the right foot PA lateral and oblique views done today show stable position of hardware stabilizing the first second and third tarsometatarsal joints.  There is moderate osteoporosis diffusely.             ASSESSMENT: Healed Lisfranc injury right foot.  I recommended hardware removal.  Postoperative rehabilitation was reviewed.  Potential complications of surgery and anesthetic were reviewed in detail and she appeared to understand all matters discussed and wished to proceed.    Diagnoses and all orders for this visit:    Lisfranc dislocation, right, subsequent encounter    Right foot pain          PLAN    Return if symptoms worsen or fail to improve.    Jon Mckeon MD

## 2021-05-21 ENCOUNTER — PREP FOR SURGERY (OUTPATIENT)
Dept: OTHER | Facility: HOSPITAL | Age: 21
End: 2021-05-21

## 2021-05-21 NOTE — H&P
Tiffany Pruitt is a 21 y.o. female returns for     No chief complaint on file.      HISTORY OF PRESENT ILLNESS: Ms. Pruitt is 21 years old.  She sustained a Lisfranc injury to the right foot in July 2020 and underwent open reduction internal fixation of the first second and third tarsometatarsal joints as well as manipulation and pinning of the fourth and fifth tarsometatarsal joints.  Her pain has slowly improved.  I recommended removal of hardware.  Anticipated benefits of the procedure were discussed and she appeared to understand and wished to proceed.    She is taking no medications on a regular basis and does not smoke.  She is allergic to amoxicillin Ceftin penicillin and sulfa drugs.      Past medical history shows her general health is good.  Previous surgeries include her right foot surgery as well as removal of wisdom teeth removal of hemangioma from forehead and laparoscopy.  There is been no history of anesthetic complication.  Previous analgesics used have been hydrocodone and Percocet both of which have caused her nausea requiring use of Zofran.    Family history she is single.    Social history she lives in [place].  She is employed as a nursing assistant.    Review of systems is remarkable for mild pain in the right foot with ambulation.      CONCURRENT MEDICAL HISTORY:    The following portions of the patient's history were reviewed and updated as appropriate: allergies, current medications, past family history, past medical history, past social history, past surgical history and problem list.         PHYSICAL EXAMINATION:       There were no vitals taken for this visit.    Physical Exam she is alert healthy appearing and in no apparent distress.  She responds appropriately to questions and commands.    HEENT exam showed extraocular movements are intact.  Oropharynx shows teeth are in good repair.    Lungs are clear to auscultation.    Cardiac exam shows a regular rhythm normal rate no  murmur.    The abdomen is soft and nontender with active bowel sounds.  Genitourinary and rectal exams were not done.    GAIT:     [x]  Normal  []  Antalgic    Assistive device: [x]  None  []  Walker     []  Crutches  []  Cane     []  Wheelchair  []  Stretcher    Ortho Exam there are well-healed surgical scars over the dorsum of the right foot.  There is hypaesthesia to soft touch over the dorsum of the foot between the 2 incisions.  There is no evidence of infection.  Posterior tibial pulses strong.  Ankle motion is full smooth and painless.    XR Foot 3+ View Right [[id number]] (Order [id number])  Order  Status: Final result   Appointment Information    PACS Images     Radiology Images   Study Result    Narrative & Impression   Ordering Provider:  Jon Mckeon MD  Ordering Diagnosis/Indication:  Lisfranc dislocation, right, subsequent encounter     Procedure:  XR FOOT 3+ VW RIGHT  Exam Date:  1/26/21     COMPARISON: Exam of the foot dated 27 October 2020     IMPRESSION: Radiographs of the right foot PA lateral and oblique views done today show stable position of hardware stabilizing the first second and third tarsometatarsal joints.  There is moderate osteoporosis diffusely.             ASSESSMENT: Healed Lisfranc injury right foot.  I recommended hardware removal.  Postoperative rehabilitation was reviewed.  Potential for wound healing problems and skin necrosis were discussed as well as potential for posttraumatic arthritis and residual pain and stiffness in the foot.  Other potential complications of surgery and anesthetic were reviewed in detail including but not limited to infection blood loss neurovascular damage sore throat pneumonia brain damage and even death.  She appears to understand all matters discussed and wishes to proceed.    There are no diagnoses linked to this encounter.      PLAN    No follow-ups on file.    Jon Mckeon MD

## 2021-05-22 ENCOUNTER — LAB (OUTPATIENT)
Dept: LAB | Facility: HOSPITAL | Age: 21
End: 2021-05-22

## 2021-05-22 DIAGNOSIS — Z01.818 PREOP TESTING: ICD-10-CM

## 2021-05-22 LAB — SARS-COV-2 N GENE RESP QL NAA+PROBE: NOT DETECTED

## 2021-05-22 PROCEDURE — C9803 HOPD COVID-19 SPEC COLLECT: HCPCS

## 2021-05-22 PROCEDURE — 87635 SARS-COV-2 COVID-19 AMP PRB: CPT

## 2021-05-25 ENCOUNTER — ANESTHESIA (OUTPATIENT)
Dept: PERIOP | Facility: HOSPITAL | Age: 21
End: 2021-05-25

## 2021-05-25 ENCOUNTER — APPOINTMENT (OUTPATIENT)
Dept: GENERAL RADIOLOGY | Facility: HOSPITAL | Age: 21
End: 2021-05-25

## 2021-05-25 ENCOUNTER — HOSPITAL ENCOUNTER (OUTPATIENT)
Facility: HOSPITAL | Age: 21
Setting detail: HOSPITAL OUTPATIENT SURGERY
Discharge: HOME | End: 2021-05-25
Attending: ORTHOPAEDIC SURGERY | Admitting: ORTHOPAEDIC SURGERY

## 2021-05-25 ENCOUNTER — ANESTHESIA EVENT (OUTPATIENT)
Dept: PERIOP | Facility: HOSPITAL | Age: 21
End: 2021-05-25

## 2021-05-25 VITALS
SYSTOLIC BLOOD PRESSURE: 130 MMHG | HEART RATE: 69 BPM | TEMPERATURE: 96.9 F | RESPIRATION RATE: 18 BRPM | HEIGHT: 64 IN | WEIGHT: 160.72 LBS | DIASTOLIC BLOOD PRESSURE: 77 MMHG | BODY MASS INDEX: 27.44 KG/M2 | OXYGEN SATURATION: 99 %

## 2021-05-25 DIAGNOSIS — S93.324D LISFRANC DISLOCATION, RIGHT, SUBSEQUENT ENCOUNTER: ICD-10-CM

## 2021-05-25 LAB
ANION GAP SERPL CALCULATED.3IONS-SCNC: 11 MMOL/L (ref 5–15)
B-HCG UR QL: NEGATIVE
BASOPHILS # BLD AUTO: 0.04 10*3/MM3 (ref 0–0.2)
BASOPHILS NFR BLD AUTO: 0.6 % (ref 0–1.5)
BILIRUB UR QL STRIP: NEGATIVE
BUN SERPL-MCNC: 17 MG/DL (ref 6–20)
BUN/CREAT SERPL: 23 (ref 7–25)
CALCIUM SPEC-SCNC: 8.9 MG/DL (ref 8.6–10.5)
CHLORIDE SERPL-SCNC: 103 MMOL/L (ref 98–107)
CLARITY UR: ABNORMAL
CO2 SERPL-SCNC: 22 MMOL/L (ref 22–29)
COLOR UR: YELLOW
CREAT SERPL-MCNC: 0.74 MG/DL (ref 0.57–1)
DEPRECATED RDW RBC AUTO: 38.9 FL (ref 37–54)
EOSINOPHIL # BLD AUTO: 0.26 10*3/MM3 (ref 0–0.4)
EOSINOPHIL NFR BLD AUTO: 4.1 % (ref 0.3–6.2)
ERYTHROCYTE [DISTWIDTH] IN BLOOD BY AUTOMATED COUNT: 12.2 % (ref 12.3–15.4)
GFR SERPL CREATININE-BSD FRML MDRD: 99 ML/MIN/1.73
GLUCOSE SERPL-MCNC: 91 MG/DL (ref 65–99)
GLUCOSE UR STRIP-MCNC: NEGATIVE MG/DL
HCT VFR BLD AUTO: 41.4 % (ref 34–46.6)
HGB BLD-MCNC: 14.1 G/DL (ref 12–15.9)
HGB UR QL STRIP.AUTO: NEGATIVE
IMM GRANULOCYTES # BLD AUTO: 0.05 10*3/MM3 (ref 0–0.05)
IMM GRANULOCYTES NFR BLD AUTO: 0.8 % (ref 0–0.5)
KETONES UR QL STRIP: NEGATIVE
LEUKOCYTE ESTERASE UR QL STRIP.AUTO: NEGATIVE
LYMPHOCYTES # BLD AUTO: 2.84 10*3/MM3 (ref 0.7–3.1)
LYMPHOCYTES NFR BLD AUTO: 44.3 % (ref 19.6–45.3)
MCH RBC QN AUTO: 30.2 PG (ref 26.6–33)
MCHC RBC AUTO-ENTMCNC: 34.1 G/DL (ref 31.5–35.7)
MCV RBC AUTO: 88.7 FL (ref 79–97)
MONOCYTES # BLD AUTO: 0.55 10*3/MM3 (ref 0.1–0.9)
MONOCYTES NFR BLD AUTO: 8.6 % (ref 5–12)
NEUTROPHILS NFR BLD AUTO: 2.67 10*3/MM3 (ref 1.7–7)
NEUTROPHILS NFR BLD AUTO: 41.6 % (ref 42.7–76)
NITRITE UR QL STRIP: NEGATIVE
NRBC BLD AUTO-RTO: 0 /100 WBC (ref 0–0.2)
PH UR STRIP.AUTO: 7 [PH] (ref 5–9)
PLATELET # BLD AUTO: 200 10*3/MM3 (ref 140–450)
PMV BLD AUTO: 12.1 FL (ref 6–12)
POTASSIUM SERPL-SCNC: 4 MMOL/L (ref 3.5–5.2)
PROT UR QL STRIP: NEGATIVE
RBC # BLD AUTO: 4.67 10*6/MM3 (ref 3.77–5.28)
SODIUM SERPL-SCNC: 136 MMOL/L (ref 136–145)
SP GR UR STRIP: 1.02 (ref 1–1.03)
UROBILINOGEN UR QL STRIP: ABNORMAL
WBC # BLD AUTO: 6.41 10*3/MM3 (ref 3.4–10.8)

## 2021-05-25 PROCEDURE — 25010000003 LIDOCAINE 1 % SOLUTION: Performed by: ORTHOPAEDIC SURGERY

## 2021-05-25 PROCEDURE — 80048 BASIC METABOLIC PNL TOTAL CA: CPT | Performed by: ORTHOPAEDIC SURGERY

## 2021-05-25 PROCEDURE — 85025 COMPLETE CBC W/AUTO DIFF WBC: CPT | Performed by: ORTHOPAEDIC SURGERY

## 2021-05-25 PROCEDURE — 25010000002 PROPOFOL 10 MG/ML EMULSION: Performed by: NURSE ANESTHETIST, CERTIFIED REGISTERED

## 2021-05-25 PROCEDURE — 25010000002 FENTANYL CITRATE (PF) 50 MCG/ML SOLUTION: Performed by: NURSE ANESTHETIST, CERTIFIED REGISTERED

## 2021-05-25 PROCEDURE — 81025 URINE PREGNANCY TEST: CPT | Performed by: ORTHOPAEDIC SURGERY

## 2021-05-25 PROCEDURE — 25010000002 MIDAZOLAM PER 1 MG: Performed by: NURSE ANESTHETIST, CERTIFIED REGISTERED

## 2021-05-25 PROCEDURE — 25010000002 ONDANSETRON PER 1 MG: Performed by: NURSE ANESTHETIST, CERTIFIED REGISTERED

## 2021-05-25 PROCEDURE — 25010000002 HYDROMORPHONE 1 MG/ML SOLUTION: Performed by: NURSE ANESTHETIST, CERTIFIED REGISTERED

## 2021-05-25 PROCEDURE — 20680 REMOVAL OF IMPLANT DEEP: CPT | Performed by: ORTHOPAEDIC SURGERY

## 2021-05-25 PROCEDURE — 81003 URINALYSIS AUTO W/O SCOPE: CPT | Performed by: ORTHOPAEDIC SURGERY

## 2021-05-25 RX ORDER — FEXOFENADINE HCL 180 MG/1
180 TABLET ORAL DAILY
COMMUNITY

## 2021-05-25 RX ORDER — LIDOCAINE HYDROCHLORIDE 10 MG/ML
INJECTION, SOLUTION INFILTRATION; PERINEURAL AS NEEDED
Status: DISCONTINUED | OUTPATIENT
Start: 2021-05-25 | End: 2021-05-25 | Stop reason: HOSPADM

## 2021-05-25 RX ORDER — FENTANYL CITRATE 50 UG/ML
INJECTION, SOLUTION INTRAMUSCULAR; INTRAVENOUS AS NEEDED
Status: DISCONTINUED | OUTPATIENT
Start: 2021-05-25 | End: 2021-05-25 | Stop reason: SURG

## 2021-05-25 RX ORDER — NALOXONE HCL 0.4 MG/ML
0.4 VIAL (ML) INJECTION AS NEEDED
Status: DISCONTINUED | OUTPATIENT
Start: 2021-05-25 | End: 2021-05-25 | Stop reason: HOSPADM

## 2021-05-25 RX ORDER — ACETAMINOPHEN 500 MG
1000 TABLET ORAL ONCE
Status: COMPLETED | OUTPATIENT
Start: 2021-05-25 | End: 2021-05-25

## 2021-05-25 RX ORDER — ONDANSETRON 4 MG/1
4 TABLET, FILM COATED ORAL EVERY 8 HOURS PRN
Qty: 20 TABLET | Refills: 0 | Status: SHIPPED | OUTPATIENT
Start: 2021-05-25

## 2021-05-25 RX ORDER — PROPOFOL 10 MG/ML
VIAL (ML) INTRAVENOUS AS NEEDED
Status: DISCONTINUED | OUTPATIENT
Start: 2021-05-25 | End: 2021-05-25 | Stop reason: SURG

## 2021-05-25 RX ORDER — PROMETHAZINE HYDROCHLORIDE 25 MG/1
25 TABLET ORAL ONCE AS NEEDED
Status: DISCONTINUED | OUTPATIENT
Start: 2021-05-25 | End: 2021-05-25 | Stop reason: HOSPADM

## 2021-05-25 RX ORDER — ONDANSETRON 2 MG/ML
4 INJECTION INTRAMUSCULAR; INTRAVENOUS ONCE AS NEEDED
Status: COMPLETED | OUTPATIENT
Start: 2021-05-25 | End: 2021-05-25

## 2021-05-25 RX ORDER — HYDROCODONE BITARTRATE AND ACETAMINOPHEN 7.5; 325 MG/1; MG/1
1 TABLET ORAL ONCE AS NEEDED
Status: DISCONTINUED | OUTPATIENT
Start: 2021-05-25 | End: 2021-05-25 | Stop reason: HOSPADM

## 2021-05-25 RX ORDER — ACETAMINOPHEN 325 MG/1
650 TABLET ORAL ONCE AS NEEDED
Status: DISCONTINUED | OUTPATIENT
Start: 2021-05-25 | End: 2021-05-25 | Stop reason: HOSPADM

## 2021-05-25 RX ORDER — BUPIVACAINE HYDROCHLORIDE 5 MG/ML
INJECTION, SOLUTION EPIDURAL; INTRACAUDAL AS NEEDED
Status: DISCONTINUED | OUTPATIENT
Start: 2021-05-25 | End: 2021-05-25 | Stop reason: HOSPADM

## 2021-05-25 RX ORDER — LIDOCAINE HYDROCHLORIDE 20 MG/ML
INJECTION, SOLUTION INFILTRATION; PERINEURAL AS NEEDED
Status: DISCONTINUED | OUTPATIENT
Start: 2021-05-25 | End: 2021-05-25 | Stop reason: SURG

## 2021-05-25 RX ORDER — ONDANSETRON 2 MG/ML
INJECTION INTRAMUSCULAR; INTRAVENOUS AS NEEDED
Status: DISCONTINUED | OUTPATIENT
Start: 2021-05-25 | End: 2021-05-25 | Stop reason: SURG

## 2021-05-25 RX ORDER — PROMETHAZINE HYDROCHLORIDE 25 MG/1
25 SUPPOSITORY RECTAL ONCE AS NEEDED
Status: DISCONTINUED | OUTPATIENT
Start: 2021-05-25 | End: 2021-05-25 | Stop reason: HOSPADM

## 2021-05-25 RX ORDER — CLINDAMYCIN PHOSPHATE 600 MG/50ML
600 INJECTION INTRAVENOUS ONCE
Status: COMPLETED | OUTPATIENT
Start: 2021-05-25 | End: 2021-05-25

## 2021-05-25 RX ORDER — SODIUM CHLORIDE, SODIUM GLUCONATE, SODIUM ACETATE, POTASSIUM CHLORIDE AND MAGNESIUM CHLORIDE 526; 502; 368; 37; 30 MG/100ML; MG/100ML; MG/100ML; MG/100ML; MG/100ML
1000 INJECTION, SOLUTION INTRAVENOUS CONTINUOUS PRN
Status: DISCONTINUED | OUTPATIENT
Start: 2021-05-25 | End: 2021-05-25 | Stop reason: HOSPADM

## 2021-05-25 RX ORDER — MEPERIDINE HYDROCHLORIDE 25 MG/ML
12.5 INJECTION INTRAMUSCULAR; INTRAVENOUS; SUBCUTANEOUS
Status: DISCONTINUED | OUTPATIENT
Start: 2021-05-25 | End: 2021-05-25 | Stop reason: HOSPADM

## 2021-05-25 RX ORDER — ACETAMINOPHEN 650 MG/1
650 SUPPOSITORY RECTAL ONCE AS NEEDED
Status: DISCONTINUED | OUTPATIENT
Start: 2021-05-25 | End: 2021-05-25 | Stop reason: HOSPADM

## 2021-05-25 RX ORDER — EPHEDRINE SULFATE 50 MG/ML
5 INJECTION, SOLUTION INTRAVENOUS ONCE AS NEEDED
Status: DISCONTINUED | OUTPATIENT
Start: 2021-05-25 | End: 2021-05-25 | Stop reason: HOSPADM

## 2021-05-25 RX ORDER — FLUMAZENIL 0.1 MG/ML
0.2 INJECTION INTRAVENOUS AS NEEDED
Status: DISCONTINUED | OUTPATIENT
Start: 2021-05-25 | End: 2021-05-25 | Stop reason: HOSPADM

## 2021-05-25 RX ORDER — DIPHENHYDRAMINE HYDROCHLORIDE 50 MG/ML
12.5 INJECTION INTRAMUSCULAR; INTRAVENOUS
Status: DISCONTINUED | OUTPATIENT
Start: 2021-05-25 | End: 2021-05-25 | Stop reason: HOSPADM

## 2021-05-25 RX ORDER — HYDROCODONE BITARTRATE AND ACETAMINOPHEN 7.5; 325 MG/1; MG/1
1-2 TABLET ORAL 4 TIMES DAILY PRN
Qty: 30 TABLET | Refills: 0 | Status: SHIPPED | OUTPATIENT
Start: 2021-05-25

## 2021-05-25 RX ORDER — MIDAZOLAM HYDROCHLORIDE 1 MG/ML
INJECTION INTRAMUSCULAR; INTRAVENOUS AS NEEDED
Status: DISCONTINUED | OUTPATIENT
Start: 2021-05-25 | End: 2021-05-25 | Stop reason: SURG

## 2021-05-25 RX ADMIN — ONDANSETRON 4 MG: 2 INJECTION INTRAMUSCULAR; INTRAVENOUS at 09:25

## 2021-05-25 RX ADMIN — ONDANSETRON 4 MG: 2 INJECTION INTRAMUSCULAR; INTRAVENOUS at 08:20

## 2021-05-25 RX ADMIN — FENTANYL CITRATE 50 MCG: 50 INJECTION INTRAMUSCULAR; INTRAVENOUS at 07:24

## 2021-05-25 RX ADMIN — CLINDAMYCIN IN 5 PERCENT DEXTROSE 600 MG: 12 INJECTION, SOLUTION INTRAVENOUS at 07:28

## 2021-05-25 RX ADMIN — SODIUM CHLORIDE, SODIUM GLUCONATE, SODIUM ACETATE, POTASSIUM CHLORIDE AND MAGNESIUM CHLORIDE 1000 ML: 526; 502; 368; 37; 30 INJECTION, SOLUTION INTRAVENOUS at 06:35

## 2021-05-25 RX ADMIN — ACETAMINOPHEN 1000 MG: 500 TABLET, FILM COATED ORAL at 06:35

## 2021-05-25 RX ADMIN — HYDROMORPHONE HYDROCHLORIDE 0.5 MG: 1 INJECTION, SOLUTION INTRAMUSCULAR; INTRAVENOUS; SUBCUTANEOUS at 08:52

## 2021-05-25 RX ADMIN — MIDAZOLAM HYDROCHLORIDE 2 MG: 2 INJECTION, SOLUTION INTRAMUSCULAR; INTRAVENOUS at 07:18

## 2021-05-25 RX ADMIN — HYDROMORPHONE HYDROCHLORIDE 0.5 MG: 1 INJECTION, SOLUTION INTRAMUSCULAR; INTRAVENOUS; SUBCUTANEOUS at 08:41

## 2021-05-25 RX ADMIN — LIDOCAINE HYDROCHLORIDE 60 MG: 20 INJECTION, SOLUTION INFILTRATION; PERINEURAL at 07:24

## 2021-05-25 RX ADMIN — PROPOFOL 150 MG: 10 INJECTION, EMULSION INTRAVENOUS at 07:24

## 2021-05-25 RX ADMIN — HYDROCODONE BITARTRATE AND ACETAMINOPHEN 1 TABLET: 7.5; 325 TABLET ORAL at 09:22

## 2021-05-25 RX ADMIN — FENTANYL CITRATE 25 MCG: 50 INJECTION INTRAMUSCULAR; INTRAVENOUS at 07:54

## 2021-05-25 RX ADMIN — FENTANYL CITRATE 25 MCG: 50 INJECTION INTRAMUSCULAR; INTRAVENOUS at 08:05

## 2021-05-25 NOTE — ANESTHESIA PROCEDURE NOTES
Airway  Urgency: elective    Date/Time: 5/25/2021 7:26 AM  Airway not difficult    General Information and Staff    Patient location during procedure: OR  CRNA: Kely Hendricks CRNA    Indications and Patient Condition  Indications for airway management: airway protection    Preoxygenated: yes  MILS not maintained throughout  Mask difficulty assessment: 1 - vent by mask    Final Airway Details  Final airway type: supraglottic airway      Successful airway: I-gel  Size 4    Number of attempts at approach: 1  Assessment: lips, teeth, and gum same as pre-op and atraumatic intubation

## 2021-05-25 NOTE — ANESTHESIA PREPROCEDURE EVALUATION
Anesthesia Evaluation     Patient summary reviewed and Nursing notes reviewed   no history of anesthetic complications:  NPO Solid Status: > 8 hours  NPO Liquid Status: > 8 hours           Airway   Mallampati: I  TM distance: >3 FB  Neck ROM: full  No difficulty expected  Comment: Date/Time: 8/3/2020 9:13 AM     General Information and Staff    Patient location during procedure: OR  CRNA: Bhanu Conner CRNA     Indications and Patient Condition  Indications for airway management: airway protection    Preoxygenated: yes  Mask difficulty assessment: 0 - not attempted     Final Airway Details  Final airway type: supraglottic airway        Successful airway: Size 4   Number of attempts at approach: 1  Assessment: lips, teeth, and gum same as pre-op  Dental - normal exam     Pulmonary - normal exam    breath sounds clear to auscultation  (+) asthma (as a child),  (-) sleep apnea, not a smoker  Cardiovascular - negative cardio ROS and normal exam  Exercise tolerance: good (4-7 METS)    Rhythm: regular  Rate: normal    (-) hypertension, valvular problems/murmurs, dysrhythmias, angina, murmur, DVT, hyperlipidemia      Neuro/Psych- negative ROS  (-) seizures, TIA, CVA, headaches, numbness, psychiatric history  GI/Hepatic/Renal/Endo - negative ROS   (-) GERD, hepatitis, liver disease, no renal disease, diabetes, no thyroid disorder    Musculoskeletal (-) negative ROS        ROS comment: Right foot dislocation    1. No acute osseous finding..        FINDINGS RIGHT FOOT: 3 views of the right foot were obtained.  There is a Lisfranc type fracture dislocation along the  tarsal-metatarsal joint. There are small avulsed fragments along the lateral aspect of the cuboid which are minimally displaced.  On the lateral view, there are some small fracture fragments dorsal to the cuneiforms favored to originate from the base of the second metatarsal. The second through fourth metatarsal bases are subluxed dorsally and the third  through fifth metatarsal bases are dislocated laterally. There is generalized soft tissue swelling. CT may be helpful to better assess the articular  relationships as well as evaluate for additional, occult  fractures which are suspected.  Abdominal    Substance History - negative use  (-) drug useAlcohol use: occ.     OB/GYN negative ob/gyn ROS   (-)  Pregnant        Other - negative ROS       (-) history of cancer                    Anesthesia Plan    ASA 2     general   (Discussed peripheral nerve block(ankle/popliteal) for post op pain relief and patient understands possible complications,risks and agrees.)  intravenous induction     Anesthetic plan, all risks, benefits, and alternatives have been provided, discussed and informed consent has been obtained with: patient.

## 2021-05-27 LAB
LAB AP CASE REPORT: NORMAL
PATH REPORT.FINAL DX SPEC: NORMAL

## 2021-06-04 ENCOUNTER — OFFICE VISIT (OUTPATIENT)
Dept: ORTHOPEDIC SURGERY | Facility: CLINIC | Age: 21
End: 2021-06-04

## 2021-06-04 VITALS
HEART RATE: 102 BPM | WEIGHT: 160 LBS | HEIGHT: 64 IN | OXYGEN SATURATION: 98 % | BODY MASS INDEX: 27.31 KG/M2 | DIASTOLIC BLOOD PRESSURE: 78 MMHG | TEMPERATURE: 98 F | SYSTOLIC BLOOD PRESSURE: 110 MMHG

## 2021-06-04 DIAGNOSIS — T84.84XA PAINFUL ORTHOPAEDIC HARDWARE (HCC): Primary | ICD-10-CM

## 2021-06-04 DIAGNOSIS — M79.671 RIGHT FOOT PAIN: ICD-10-CM

## 2021-06-04 DIAGNOSIS — S93.324D LISFRANC DISLOCATION, RIGHT, SUBSEQUENT ENCOUNTER: ICD-10-CM

## 2021-06-04 PROCEDURE — 99024 POSTOP FOLLOW-UP VISIT: CPT | Performed by: ORTHOPAEDIC SURGERY

## 2021-06-04 NOTE — PROGRESS NOTES
Tiffany Pruitt is a 21 y.o. female is s/p        05/25/21 (10d) Jon Mckeon MD    HARDWARE REMOVAL right foot                          (c-arm #3)  CPT code 31355 - Right       Chief Complaint   Patient presents with   • Right Foot - Follow-up, Pain       HISTORY OF PRESENT ILLNESS: Ms. Pruitt is now 10 days following uncomplicated hardware removal.  Her pain is been well controlled.  She is using her boot orthosis only intermittently.       Allergies   Allergen Reactions   • Amoxicillin Hives   • Ceftin [Cefuroxime] Hives   • Ciprocinonide [Fluocinolone] Hives   • Penicillins Hives   • Sulfa Antibiotics Other (See Comments)     Pt does not know the reaction to this med         Current Outpatient Medications:   •  fexofenadine (ALLEGRA) 180 MG tablet, Take 180 mg by mouth Daily., Disp: , Rfl:   •  HYDROcodone-acetaminophen (NORCO) 7.5-325 MG per tablet, Take 1-2 tablets by mouth 4 (Four) Times a Day As Needed for Moderate Pain  (Pain)., Disp: 30 tablet, Rfl: 0  •  ibuprofen (ADVIL,MOTRIN) 200 MG tablet, Take 200 mg by mouth Every 6 (Six) Hours As Needed for Mild Pain ., Disp: , Rfl:   •  norelgestromin-ethinyl estradiol (ORTHO EVRA) 150-35 MCG/24HR, Place 1 patch on the skin as directed by provider 1 (One) Time Per Week., Disp: , Rfl:   •  ondansetron (Zofran) 4 MG tablet, Take 1 tablet by mouth Every 8 (Eight) Hours As Needed for Nausea or Vomiting., Disp: 20 tablet, Rfl: 0    No fevers or chills.  No nausea or vomiting.      PHYSICAL EXAMINATION:       Tiffany Pruitt is a 21 y.o. female    Patient is awake and alert, answers questions appropriately and is in no apparent distress.    GAIT:     []  Normal  []  Antalgic    Assistive device: []  None  []  Walker     [x]  Walking boot []  Cane     []  Wheelchair  []  Stretcher    Ortho Exam her wounds are clean and dry with no evidence of infection.  Sensory exam is intact to soft touch.  Vitals:    06/04/21 0818   BP: 110/78   Pulse: 102   Temp: 98  "°F (36.7 °C)   SpO2: 98%   Weight: 72.6 kg (160 lb)   Height: 162.6 cm (64\")   PainSc:   1     No results found.        ASSESSMENT: Satisfactory progress following hardware removal.  She may advance activities as tolerated.  She was given a new Ace wrap for soft support.    Return here in 6 weeks if her symptoms have not improved.    Diagnoses and all orders for this visit:    Painful orthopaedic hardware (CMS/HCC)    Right foot pain    Lisfranc dislocation, right, subsequent encounter          PLAN    Return in about 6 weeks (around 7/16/2021).    Jon Mckeon MD  "

## 2021-06-16 ENCOUNTER — TELEPHONE (OUTPATIENT)
Dept: ORTHOPEDIC SURGERY | Facility: CLINIC | Age: 21
End: 2021-06-16

## 2021-06-16 NOTE — TELEPHONE ENCOUNTER
Pt CALLED STATING HER RIGHT FOOT IS RED AND WARM TO THE TOUCH SHE STATES HER PAIN HAS INCREASED IN THE LAST COUPLE OF DAYS AND IS JUST VERY UNCOMFORTABLE      Pt IS ALSO REQUESTING TO BE RELEASED BACK TO WORK     PLEASE ADVISE   CALL BACK # [phone]    I spoke with the patient by phone.  She describes recent worsening of her pain.  She also that she had some clear drainage from her foot but this has stopped.  I told her I need to see her in the office and asked her to come in either this afternoon or tomorrow morning.

## 2021-06-18 ENCOUNTER — OFFICE VISIT (OUTPATIENT)
Dept: ORTHOPEDIC SURGERY | Facility: CLINIC | Age: 21
End: 2021-06-18

## 2021-06-18 VITALS
DIASTOLIC BLOOD PRESSURE: 77 MMHG | TEMPERATURE: 97.4 F | HEART RATE: 73 BPM | OXYGEN SATURATION: 98 % | SYSTOLIC BLOOD PRESSURE: 127 MMHG | WEIGHT: 160 LBS | HEIGHT: 64 IN | BODY MASS INDEX: 27.31 KG/M2

## 2021-06-18 DIAGNOSIS — T84.84XA PAINFUL ORTHOPAEDIC HARDWARE (HCC): Primary | ICD-10-CM

## 2021-06-18 DIAGNOSIS — S93.324D LISFRANC DISLOCATION, RIGHT, SUBSEQUENT ENCOUNTER: ICD-10-CM

## 2021-06-18 DIAGNOSIS — M79.671 RIGHT FOOT PAIN: ICD-10-CM

## 2021-06-18 PROCEDURE — 99024 POSTOP FOLLOW-UP VISIT: CPT | Performed by: ORTHOPAEDIC SURGERY

## 2021-06-18 RX ORDER — CLINDAMYCIN HYDROCHLORIDE 300 MG/1
300 CAPSULE ORAL 3 TIMES DAILY
Qty: 20 CAPSULE | Refills: 0 | Status: SHIPPED | OUTPATIENT
Start: 2021-06-18

## 2021-06-18 RX ORDER — ERGOCALCIFEROL 1.25 MG/1
CAPSULE ORAL
COMMUNITY
Start: 2021-06-04

## 2021-06-18 RX ORDER — ATORVASTATIN CALCIUM 10 MG/1
TABLET, FILM COATED ORAL
COMMUNITY
Start: 2021-06-04

## 2021-06-18 RX ORDER — BUSPIRONE HYDROCHLORIDE 5 MG/1
TABLET ORAL
COMMUNITY
Start: 2021-06-09

## 2021-06-18 NOTE — PROGRESS NOTES
Tiffany Pruitt is a 21 y.o. female is s/p     05/25/21 (3w 3d) Jon Mckeon MD    HARDWARE REMOVAL right foot                          (c-arm #3)  CPT code 64171 - Right        Chief Complaint   Patient presents with   • Right Foot - Follow-up   • Wound Check       HISTORY OF PRESENT ILLNESS:Patient states redness and pain with drainage right foot.  Pain scale today 3/10    Ms. Pruitt had her surgery on 25 May.  She said she was doing well until about 2 days ago.  She said she was doing some work around her house previous day on on awakening 2 days ago had pain in the foot.  Apparently she has had some drainage from the foot over the last couple days or so.  I asked her to come in for wound check.       Allergies   Allergen Reactions   • Amoxicillin Hives   • Ceftin [Cefuroxime] Hives   • Ciprocinonide [Fluocinolone] Hives   • Penicillins Hives   • Sulfa Antibiotics Other (See Comments)     Pt does not know the reaction to this med         Current Outpatient Medications:   •  atorvastatin (LIPITOR) 10 MG tablet, , Disp: , Rfl:   •  busPIRone (BUSPAR) 5 MG tablet, , Disp: , Rfl:   •  fexofenadine (ALLEGRA) 180 MG tablet, Take 180 mg by mouth Daily., Disp: , Rfl:   •  HYDROcodone-acetaminophen (NORCO) 7.5-325 MG per tablet, Take 1-2 tablets by mouth 4 (Four) Times a Day As Needed for Moderate Pain  (Pain)., Disp: 30 tablet, Rfl: 0  •  ibuprofen (ADVIL,MOTRIN) 200 MG tablet, Take 200 mg by mouth Every 6 (Six) Hours As Needed for Mild Pain ., Disp: , Rfl:   •  norelgestromin-ethinyl estradiol (ORTHO EVRA) 150-35 MCG/24HR, Place 1 patch on the skin as directed by provider 1 (One) Time Per Week., Disp: , Rfl:   •  ondansetron (Zofran) 4 MG tablet, Take 1 tablet by mouth Every 8 (Eight) Hours As Needed for Nausea or Vomiting., Disp: 20 tablet, Rfl: 0  •  vitamin D (ERGOCALCIFEROL) 1.25 MG (41716 UT) capsule capsule, , Disp: , Rfl:     No fevers or chills.  No nausea or vomiting.      PHYSICAL EXAMINATION:        Tiffany Pruitt is a 21 y.o. female    Patient is awake and alert, answers questions appropriately and is in no apparent distress.    GAIT:     []  Normal  [x]  Antalgic    Assistive device: [x]  None  []  Walker     []  Crutches  []  Cane     []  Wheelchair  []  Stretcher    Ortho Exam she walks with a faintly antalgic gait favoring the right.  She has regular footwear on today.  There was mild erythema about the distal aspect of her lateral wound on the dorsum of the foot.  There is no drainage nor any cellulitis and no palpable fluid.      No results found.        ASSESSMENT: Right foot pain following hardware removal.  Her symptoms may be related to her activities prior to the onset of her symptoms and could also represent an early wound infection.    She was instructed in activity restriction.  She was also advised to avoid friction on the dorsum of her foot from shoes.  I will call in a prescription for clindamycin.  Return here in 5 days for clinical exam.  If her symptoms worsen in the meantime she will call for earlier evaluation.    Diagnoses and all orders for this visit:    Painful orthopaedic hardware (CMS/HCC)    Right foot pain    Lisfranc dislocation, right, subsequent encounter    Other orders  -     busPIRone (BUSPAR) 5 MG tablet  -     atorvastatin (LIPITOR) 10 MG tablet  -     vitamin D (ERGOCALCIFEROL) 1.25 MG (73418 UT) capsule capsule          PLAN    Return in about 5 days (around 6/23/2021).    Jon Mckeon MD

## 2021-06-23 ENCOUNTER — OFFICE VISIT (OUTPATIENT)
Dept: ORTHOPEDIC SURGERY | Facility: CLINIC | Age: 21
End: 2021-06-23

## 2021-06-23 VITALS — HEIGHT: 64 IN | WEIGHT: 160 LBS | HEART RATE: 52 BPM | OXYGEN SATURATION: 98 % | BODY MASS INDEX: 27.31 KG/M2

## 2021-06-23 DIAGNOSIS — T84.84XA PAINFUL ORTHOPAEDIC HARDWARE (HCC): Primary | ICD-10-CM

## 2021-06-23 DIAGNOSIS — M79.671 RIGHT FOOT PAIN: ICD-10-CM

## 2021-06-23 PROCEDURE — 99024 POSTOP FOLLOW-UP VISIT: CPT | Performed by: ORTHOPAEDIC SURGERY

## 2021-06-23 NOTE — PROGRESS NOTES
Tiffany Pruitt is a 21 y.o. female is s/p     05/25/21 (4w 1d) Jon Mckeon MD    HARDWARE REMOVAL right foot                          (c-arm #3)  CPT code 99731 - Right          Chief Complaint   Patient presents with   • Right Foot - Follow-up, Pain       HISTORY OF PRESENT ILLNESS:follow up right foot.  Pain scale today 0/10    Ms. Pruitt says her pain has resolved.  She has had no further drainage.  Her surgery was 1 month ago.       Allergies   Allergen Reactions   • Amoxicillin Hives   • Ceftin [Cefuroxime] Hives   • Ciprocinonide [Fluocinolone] Hives   • Penicillins Hives   • Sulfa Antibiotics Other (See Comments)     Pt does not know the reaction to this med         Current Outpatient Medications:   •  atorvastatin (LIPITOR) 10 MG tablet, , Disp: , Rfl:   •  busPIRone (BUSPAR) 5 MG tablet, , Disp: , Rfl:   •  clindamycin (Cleocin) 300 MG capsule, Take 1 capsule by mouth 3 (Three) Times a Day., Disp: 20 capsule, Rfl: 0  •  fexofenadine (ALLEGRA) 180 MG tablet, Take 180 mg by mouth Daily., Disp: , Rfl:   •  HYDROcodone-acetaminophen (NORCO) 7.5-325 MG per tablet, Take 1-2 tablets by mouth 4 (Four) Times a Day As Needed for Moderate Pain  (Pain)., Disp: 30 tablet, Rfl: 0  •  ibuprofen (ADVIL,MOTRIN) 200 MG tablet, Take 200 mg by mouth Every 6 (Six) Hours As Needed for Mild Pain ., Disp: , Rfl:   •  norelgestromin-ethinyl estradiol (ORTHO EVRA) 150-35 MCG/24HR, Place 1 patch on the skin as directed by provider 1 (One) Time Per Week., Disp: , Rfl:   •  ondansetron (Zofran) 4 MG tablet, Take 1 tablet by mouth Every 8 (Eight) Hours As Needed for Nausea or Vomiting., Disp: 20 tablet, Rfl: 0  •  vitamin D (ERGOCALCIFEROL) 1.25 MG (86676 UT) capsule capsule, , Disp: , Rfl:     No fevers or chills.  No nausea or vomiting.      PHYSICAL EXAMINATION:       Tiffany Pruitt is a 21 y.o. female    Patient is awake and alert, answers questions appropriately and is in no apparent distress.    GAIT:     []   Normal  []  Antalgic    Assistive device: []  None  []  Walker     []  Crutches  []  Cane     []  Wheelchair  []  Stretcher    Ortho Exam she walks with a normal gait.  Her wound is sealed.  There is mild erythema over the distal aspect of her lateral wound but no cellulitis and no tenderness.      No results found.        ASSESSMENT: Satisfactory progress.  She was instructed in avoidance of local pressure on the dorsum of the foot and will tie her shoes loosely.  She will complete her course of antibiotics.  She was given a note to allow her to return return to her regular work tomorrow.    Return to see me in 1 month if her symptoms have not improved.  The natural history of Lisfranc injury was discussed with her.    Diagnoses and all orders for this visit:    Painful orthopaedic hardware (CMS/HCC)    Right foot pain          PLAN    Return in about 4 weeks (around 7/21/2021).    Jon Mckeon MD
